# Patient Record
Sex: MALE | Race: WHITE | NOT HISPANIC OR LATINO | Employment: FULL TIME | URBAN - METROPOLITAN AREA
[De-identification: names, ages, dates, MRNs, and addresses within clinical notes are randomized per-mention and may not be internally consistent; named-entity substitution may affect disease eponyms.]

---

## 2023-03-14 PROBLEM — H18.513 FUCHS' CORNEAL DYSTROPHY OF BOTH EYES: Status: ACTIVE | Noted: 2023-03-14

## 2023-03-14 PROBLEM — R73.9 ELEVATED BLOOD SUGAR: Status: ACTIVE | Noted: 2023-03-14

## 2023-03-14 PROBLEM — K64.9 HEMORRHOID: Status: ACTIVE | Noted: 2023-03-14

## 2023-03-14 PROBLEM — E66.01 MORBID OBESITY WITH BMI OF 40.0-44.9, ADULT (MULTI): Status: ACTIVE | Noted: 2023-03-14

## 2023-03-14 PROBLEM — E78.5 HYPERLIPEMIA: Status: ACTIVE | Noted: 2023-03-14

## 2023-03-14 PROBLEM — E66.9 CLASS 2 OBESITY WITH BODY MASS INDEX (BMI) OF 39.0 TO 39.9 IN ADULT: Status: ACTIVE | Noted: 2023-03-14

## 2023-03-14 PROBLEM — G51.0 BELL'S PALSY: Status: ACTIVE | Noted: 2023-03-14

## 2023-03-14 PROBLEM — G47.33 OSA (OBSTRUCTIVE SLEEP APNEA): Status: ACTIVE | Noted: 2023-03-14

## 2023-03-14 PROBLEM — E55.9 VITAMIN D DEFICIENCY: Status: ACTIVE | Noted: 2023-03-14

## 2023-03-14 PROBLEM — I10 BENIGN HYPERTENSION: Status: ACTIVE | Noted: 2023-03-14

## 2023-03-14 PROBLEM — K21.9 GERD (GASTROESOPHAGEAL REFLUX DISEASE): Status: ACTIVE | Noted: 2023-03-14

## 2023-03-14 PROBLEM — K75.81 NASH (NONALCOHOLIC STEATOHEPATITIS): Status: ACTIVE | Noted: 2023-03-14

## 2023-03-14 PROBLEM — E11.9 TYPE 2 DIABETES MELLITUS WITHOUT COMPLICATION, WITHOUT LONG-TERM CURRENT USE OF INSULIN (MULTI): Status: ACTIVE | Noted: 2023-03-14

## 2023-03-14 PROBLEM — G56.00 CARPAL TUNNEL SYNDROME: Status: ACTIVE | Noted: 2023-03-14

## 2023-03-14 PROBLEM — E66.812 CLASS 2 OBESITY WITH BODY MASS INDEX (BMI) OF 39.0 TO 39.9 IN ADULT: Status: ACTIVE | Noted: 2023-03-14

## 2023-03-14 PROBLEM — M10.9 GOUT: Status: ACTIVE | Noted: 2023-03-14

## 2023-03-14 RX ORDER — ALLOPURINOL 100 MG/1
1 TABLET ORAL DAILY
COMMUNITY
Start: 2019-08-02 | End: 2023-07-07 | Stop reason: SDUPTHER

## 2023-03-14 RX ORDER — NEBIVOLOL 5 MG/1
1 TABLET ORAL DAILY
COMMUNITY
Start: 2020-10-01 | End: 2023-07-07 | Stop reason: SDUPTHER

## 2023-03-14 RX ORDER — FLUOROMETHOLONE 1 MG/ML
1 SUSPENSION/ DROPS OPHTHALMIC
COMMUNITY
Start: 2022-03-30

## 2023-03-14 RX ORDER — ATORVASTATIN CALCIUM 40 MG/1
1 TABLET, FILM COATED ORAL DAILY
COMMUNITY
Start: 2019-08-02 | End: 2023-07-07 | Stop reason: SDUPTHER

## 2023-03-14 RX ORDER — ASPIRIN 81 MG/1
81 TABLET ORAL
COMMUNITY

## 2023-03-14 RX ORDER — LORATADINE 10 MG/1
10 TABLET ORAL
COMMUNITY

## 2023-03-14 RX ORDER — CHOLECALCIFEROL (VITAMIN D3) 50 MCG
50 TABLET ORAL
COMMUNITY

## 2023-03-14 RX ORDER — METFORMIN HYDROCHLORIDE 500 MG/1
1 TABLET, EXTENDED RELEASE ORAL DAILY
COMMUNITY
Start: 2022-07-15 | End: 2023-07-07 | Stop reason: SDUPTHER

## 2023-03-15 ENCOUNTER — OFFICE VISIT (OUTPATIENT)
Dept: PRIMARY CARE | Facility: CLINIC | Age: 58
End: 2023-03-15
Payer: COMMERCIAL

## 2023-03-15 VITALS
SYSTOLIC BLOOD PRESSURE: 116 MMHG | BODY MASS INDEX: 41.29 KG/M2 | TEMPERATURE: 97.2 F | WEIGHT: 288.4 LBS | HEIGHT: 70 IN | HEART RATE: 79 BPM | DIASTOLIC BLOOD PRESSURE: 74 MMHG

## 2023-03-15 DIAGNOSIS — I10 BENIGN HYPERTENSION: ICD-10-CM

## 2023-03-15 DIAGNOSIS — E11.9 TYPE 2 DIABETES MELLITUS WITHOUT COMPLICATION, WITHOUT LONG-TERM CURRENT USE OF INSULIN (MULTI): ICD-10-CM

## 2023-03-15 DIAGNOSIS — J06.9 ACUTE UPPER RESPIRATORY INFECTION, UNSPECIFIED: Primary | ICD-10-CM

## 2023-03-15 PROCEDURE — 3078F DIAST BP <80 MM HG: CPT | Performed by: FAMILY MEDICINE

## 2023-03-15 PROCEDURE — 3074F SYST BP LT 130 MM HG: CPT | Performed by: FAMILY MEDICINE

## 2023-03-15 PROCEDURE — 99213 OFFICE O/P EST LOW 20 MIN: CPT | Performed by: FAMILY MEDICINE

## 2023-03-15 RX ORDER — AMOXICILLIN AND CLAVULANATE POTASSIUM 875; 125 MG/1; MG/1
875 TABLET, FILM COATED ORAL
Qty: 14 TABLET | Refills: 0 | Status: SHIPPED | OUTPATIENT
Start: 2023-03-15 | End: 2023-03-22

## 2023-03-15 NOTE — PROGRESS NOTES
"Subjective    Mohan Gibbons is a 57 y.o. male who presents for URI (Chest conrad , cough, no wheeze or sob/Sx started beginning of march).    Chect congestion  3/1/23 start day of symptoms, week 2 of illness, no real nasal congestion, using cpap machine    URI          Objective   /74   Pulse 79   Temp 36.2 °C (97.2 °F)   Ht 1.778 m (5' 10\")   Wt 131 kg (288 lb 6.4 oz)   BMI 41.38 kg/m²     Physical Exam  Vitals reviewed.   Constitutional:       General: He is not in acute distress.     Appearance: Normal appearance.   HENT:      Head: Normocephalic and atraumatic.      Right Ear: Tympanic membrane and external ear normal.      Left Ear: Tympanic membrane and external ear normal.      Nose: Congestion and rhinorrhea present.      Mouth/Throat:      Mouth: Mucous membranes are moist.      Pharynx: Posterior oropharyngeal erythema present.   Eyes:      Conjunctiva/sclera: Conjunctivae normal.   Cardiovascular:      Rate and Rhythm: Normal rate and regular rhythm.      Heart sounds: No murmur heard.  Pulmonary:      Effort: Pulmonary effort is normal.      Breath sounds: Rhonchi present. No wheezing or rales.   Musculoskeletal:      Cervical back: Normal range of motion.      Right lower leg: No edema.      Left lower leg: No edema.   Neurological:      Mental Status: He is alert.   Psychiatric:         Mood and Affect: Mood normal.         Behavior: Behavior normal.         Assessment/Plan   Problem List Items Addressed This Visit          Circulatory    Benign hypertension       Endocrine/Metabolic    Type 2 diabetes mellitus without complication, without long-term current use of insulin (CMS/[facility])     Other Visit Diagnoses       Acute upper respiratory infection, unspecified    -  Primary               "

## 2023-07-07 ENCOUNTER — OFFICE VISIT (OUTPATIENT)
Dept: PRIMARY CARE | Facility: CLINIC | Age: 58
End: 2023-07-07
Payer: COMMERCIAL

## 2023-07-07 ENCOUNTER — LAB (OUTPATIENT)
Dept: LAB | Facility: LAB | Age: 58
End: 2023-07-07
Payer: COMMERCIAL

## 2023-07-07 VITALS
BODY MASS INDEX: 40.92 KG/M2 | DIASTOLIC BLOOD PRESSURE: 78 MMHG | HEART RATE: 69 BPM | SYSTOLIC BLOOD PRESSURE: 128 MMHG | TEMPERATURE: 96.7 F | HEIGHT: 70 IN | WEIGHT: 285.8 LBS

## 2023-07-07 DIAGNOSIS — E11.69 TYPE 2 DIABETES MELLITUS WITH OTHER SPECIFIED COMPLICATION, UNSPECIFIED WHETHER LONG TERM INSULIN USE (MULTI): ICD-10-CM

## 2023-07-07 DIAGNOSIS — E11.9 TYPE 2 DIABETES MELLITUS WITHOUT COMPLICATION, WITHOUT LONG-TERM CURRENT USE OF INSULIN (MULTI): ICD-10-CM

## 2023-07-07 DIAGNOSIS — Z00.00 ANNUAL PHYSICAL EXAM: Primary | ICD-10-CM

## 2023-07-07 DIAGNOSIS — E66.01 MORBID OBESITY WITH BMI OF 40.0-44.9, ADULT (MULTI): ICD-10-CM

## 2023-07-07 DIAGNOSIS — G47.33 OSA (OBSTRUCTIVE SLEEP APNEA): ICD-10-CM

## 2023-07-07 DIAGNOSIS — E78.2 MIXED HYPERLIPIDEMIA: ICD-10-CM

## 2023-07-07 DIAGNOSIS — E55.9 VITAMIN D DEFICIENCY: ICD-10-CM

## 2023-07-07 DIAGNOSIS — B35.1 TOENAIL FUNGUS: ICD-10-CM

## 2023-07-07 DIAGNOSIS — K75.81 NASH (NONALCOHOLIC STEATOHEPATITIS): ICD-10-CM

## 2023-07-07 DIAGNOSIS — Z12.5 SCREENING FOR PROSTATE CANCER: ICD-10-CM

## 2023-07-07 DIAGNOSIS — I10 BENIGN HYPERTENSION: ICD-10-CM

## 2023-07-07 DIAGNOSIS — M10.9 GOUT, UNSPECIFIED CAUSE, UNSPECIFIED CHRONICITY, UNSPECIFIED SITE: ICD-10-CM

## 2023-07-07 LAB
ALANINE AMINOTRANSFERASE (SGPT) (U/L) IN SER/PLAS: 28 U/L (ref 10–52)
ALBUMIN (G/DL) IN SER/PLAS: 4 G/DL (ref 3.4–5)
ALBUMIN (MG/L) IN URINE: <7 MG/L
ALBUMIN/CREATININE (UG/MG) IN URINE: NORMAL UG/MG CRT (ref 0–30)
ALKALINE PHOSPHATASE (U/L) IN SER/PLAS: 78 U/L (ref 33–120)
ANION GAP IN SER/PLAS: 12 MMOL/L (ref 10–20)
ASPARTATE AMINOTRANSFERASE (SGOT) (U/L) IN SER/PLAS: 21 U/L (ref 9–39)
BILIRUBIN TOTAL (MG/DL) IN SER/PLAS: 0.6 MG/DL (ref 0–1.2)
CALCIDIOL (25 OH VITAMIN D3) (NG/ML) IN SER/PLAS: 37 NG/ML
CALCIUM (MG/DL) IN SER/PLAS: 9 MG/DL (ref 8.6–10.3)
CARBON DIOXIDE, TOTAL (MMOL/L) IN SER/PLAS: 24 MMOL/L (ref 21–32)
CHLORIDE (MMOL/L) IN SER/PLAS: 107 MMOL/L (ref 98–107)
CHOLESTEROL (MG/DL) IN SER/PLAS: 124 MG/DL (ref 0–199)
CHOLESTEROL IN HDL (MG/DL) IN SER/PLAS: 33.2 MG/DL
CHOLESTEROL/HDL RATIO: 3.7
CREATININE (MG/DL) IN SER/PLAS: 0.87 MG/DL (ref 0.5–1.3)
CREATININE (MG/DL) IN URINE: 180 MG/DL (ref 20–370)
ERYTHROCYTE DISTRIBUTION WIDTH (RATIO) BY AUTOMATED COUNT: 13.3 % (ref 11.5–14.5)
ERYTHROCYTE MEAN CORPUSCULAR HEMOGLOBIN CONCENTRATION (G/DL) BY AUTOMATED: 32.8 G/DL (ref 32–36)
ERYTHROCYTE MEAN CORPUSCULAR VOLUME (FL) BY AUTOMATED COUNT: 92 FL (ref 80–100)
ERYTHROCYTES (10*6/UL) IN BLOOD BY AUTOMATED COUNT: 4.55 X10E12/L (ref 4.5–5.9)
GFR MALE: >90 ML/MIN/1.73M2
GLUCOSE (MG/DL) IN SER/PLAS: 145 MG/DL (ref 74–99)
HEMATOCRIT (%) IN BLOOD BY AUTOMATED COUNT: 41.8 % (ref 41–52)
HEMOGLOBIN (G/DL) IN BLOOD: 13.7 G/DL (ref 13.5–17.5)
LDL: 46 MG/DL (ref 0–99)
LEUKOCYTES (10*3/UL) IN BLOOD BY AUTOMATED COUNT: 8.5 X10E9/L (ref 4.4–11.3)
NON HDL CHOLESTEROL: 91 MG/DL
PLATELETS (10*3/UL) IN BLOOD AUTOMATED COUNT: 233 X10E9/L (ref 150–450)
POC HEMOGLOBIN A1C: 6.5 % (ref 4.2–6.5)
POTASSIUM (MMOL/L) IN SER/PLAS: 4.5 MMOL/L (ref 3.5–5.3)
PROSTATE SPECIFIC AG (NG/ML) IN SER/PLAS: 0.29 NG/ML (ref 0–4)
PROTEIN TOTAL: 6.7 G/DL (ref 6.4–8.2)
SODIUM (MMOL/L) IN SER/PLAS: 138 MMOL/L (ref 136–145)
TRIGLYCERIDE (MG/DL) IN SER/PLAS: 226 MG/DL (ref 0–149)
UREA NITROGEN (MG/DL) IN SER/PLAS: 15 MG/DL (ref 6–23)
VLDL: 45 MG/DL (ref 0–40)

## 2023-07-07 PROCEDURE — 80061 LIPID PANEL: CPT

## 2023-07-07 PROCEDURE — 1036F TOBACCO NON-USER: CPT | Performed by: FAMILY MEDICINE

## 2023-07-07 PROCEDURE — 3008F BODY MASS INDEX DOCD: CPT | Performed by: FAMILY MEDICINE

## 2023-07-07 PROCEDURE — 82043 UR ALBUMIN QUANTITATIVE: CPT

## 2023-07-07 PROCEDURE — 3078F DIAST BP <80 MM HG: CPT | Performed by: FAMILY MEDICINE

## 2023-07-07 PROCEDURE — 3074F SYST BP LT 130 MM HG: CPT | Performed by: FAMILY MEDICINE

## 2023-07-07 PROCEDURE — 82306 VITAMIN D 25 HYDROXY: CPT

## 2023-07-07 PROCEDURE — 82570 ASSAY OF URINE CREATININE: CPT

## 2023-07-07 PROCEDURE — 84153 ASSAY OF PSA TOTAL: CPT

## 2023-07-07 PROCEDURE — 85027 COMPLETE CBC AUTOMATED: CPT

## 2023-07-07 PROCEDURE — 80053 COMPREHEN METABOLIC PANEL: CPT

## 2023-07-07 PROCEDURE — 36415 COLL VENOUS BLD VENIPUNCTURE: CPT

## 2023-07-07 PROCEDURE — 99396 PREV VISIT EST AGE 40-64: CPT | Performed by: FAMILY MEDICINE

## 2023-07-07 PROCEDURE — 83036 HEMOGLOBIN GLYCOSYLATED A1C: CPT | Performed by: FAMILY MEDICINE

## 2023-07-07 RX ORDER — NEBIVOLOL 5 MG/1
5 TABLET ORAL DAILY
Qty: 90 TABLET | Refills: 3 | Status: SHIPPED | OUTPATIENT
Start: 2023-07-07 | End: 2023-07-10 | Stop reason: SDUPTHER

## 2023-07-07 RX ORDER — METFORMIN HYDROCHLORIDE 500 MG/1
500 TABLET, EXTENDED RELEASE ORAL
Qty: 90 TABLET | Refills: 3 | Status: SHIPPED | OUTPATIENT
Start: 2023-07-07

## 2023-07-07 RX ORDER — CICLOPIROX 80 MG/ML
SOLUTION TOPICAL NIGHTLY
Qty: 6.6 ML | Refills: 2 | Status: SHIPPED | OUTPATIENT
Start: 2023-07-07

## 2023-07-07 RX ORDER — ALLOPURINOL 100 MG/1
100 TABLET ORAL DAILY
Qty: 90 TABLET | Refills: 3 | Status: SHIPPED | OUTPATIENT
Start: 2023-07-07

## 2023-07-07 RX ORDER — ATORVASTATIN CALCIUM 40 MG/1
40 TABLET, FILM COATED ORAL DAILY
Qty: 90 TABLET | Refills: 3 | Status: SHIPPED | OUTPATIENT
Start: 2023-07-07

## 2023-07-07 NOTE — PROGRESS NOTES
"Subjective    Mohan Gibbons is a 57 y.o. male who presents for No chief complaint on file..    Toenail fungus great toes b/l         Objective   /78   Pulse 69   Temp 35.9 °C (96.7 °F)   Ht 1.778 m (5' 10\")   Wt 130 kg (285 lb 12.8 oz)   BMI 41.01 kg/m²     Physical Exam  Vitals reviewed.   Constitutional:       General: He is not in acute distress.     Appearance: Normal appearance.   HENT:      Head: Normocephalic and atraumatic.      Right Ear: Tympanic membrane and ear canal normal.      Left Ear: Tympanic membrane and ear canal normal.   Eyes:      General: No scleral icterus.     Conjunctiva/sclera: Conjunctivae normal.   Cardiovascular:      Rate and Rhythm: Normal rate and regular rhythm.      Heart sounds: No murmur heard.  Pulmonary:      Effort: Pulmonary effort is normal.      Breath sounds: No wheezing, rhonchi or rales.   Abdominal:      General: Bowel sounds are normal.      Palpations: Abdomen is soft.      Tenderness: There is no abdominal tenderness.   Musculoskeletal:      Right lower leg: No edema.      Left lower leg: No edema.   Skin:     General: Skin is warm and dry.      Findings: No rash.      Comments: Thick brown discolored toenails b/l first toe   Neurological:      General: No focal deficit present.      Mental Status: He is alert.      Gait: Gait normal.   Psychiatric:         Mood and Affect: Mood normal.         Behavior: Behavior normal.         Assessment/Plan   Problem List Items Addressed This Visit       Benign hypertension    Relevant Medications    nebivolol (Bystolic) 5 mg tablet    Gout    Relevant Medications    allopurinol (Zyloprim) 100 mg tablet    Hyperlipemia    Relevant Medications    atorvastatin (Lipitor) 40 mg tablet    LING (nonalcoholic steatohepatitis)    ALONA (obstructive sleep apnea)    Type 2 diabetes mellitus without complication, without long-term current use of insulin (CMS/[facility])    Relevant Orders    POCT glycosylated hemoglobin (Hb A1C) " manually resulted (Completed)    CBC    Comprehensive Metabolic Panel    Lipid Panel    Vitamin D deficiency    Relevant Orders    Vitamin D, Total    Morbid obesity with BMI of 40.0-44.9, adult (CMS/[facility])     Other Visit Diagnoses       Annual physical exam    -  Primary    Screening for prostate cancer        Relevant Orders    Prostate Specific Antigen    Type 2 diabetes mellitus with other specified complication, unspecified whether long term insulin use (CMS/[facility])        Relevant Medications    metFORMIN XR (Glucophage-XR) 500 mg 24 hr tablet    Other Relevant Orders    Albumin , Urine Random    Toenail fungus        Relevant Medications    ciclopirox (Penlac) 8 % solution        Current Plans  · You are advised to get a flu shot annually  · You should eat a healthy diet and get regular exercise.  · You should see your dentist regularly every 6 months for dental health checkups unless you have had your teeth removed.  · Follow up in 6 mo for blood sugar recheckor as needed

## 2023-07-10 DIAGNOSIS — I10 BENIGN HYPERTENSION: ICD-10-CM

## 2023-07-10 RX ORDER — NEBIVOLOL 10 MG/1
10 TABLET ORAL DAILY
Qty: 90 TABLET | Refills: 3 | Status: SHIPPED | OUTPATIENT
Start: 2023-07-10

## 2024-05-13 ENCOUNTER — OFFICE VISIT (OUTPATIENT)
Dept: SLEEP MEDICINE | Facility: CLINIC | Age: 59
End: 2024-05-13
Payer: COMMERCIAL

## 2024-05-13 VITALS
RESPIRATION RATE: 18 BRPM | HEART RATE: 66 BPM | DIASTOLIC BLOOD PRESSURE: 78 MMHG | BODY MASS INDEX: 40.31 KG/M2 | SYSTOLIC BLOOD PRESSURE: 126 MMHG | WEIGHT: 281.6 LBS | OXYGEN SATURATION: 94 % | HEIGHT: 70 IN

## 2024-05-13 DIAGNOSIS — G47.33 OSA (OBSTRUCTIVE SLEEP APNEA): Primary | ICD-10-CM

## 2024-05-13 PROCEDURE — 99214 OFFICE O/P EST MOD 30 MIN: CPT | Performed by: GENERAL PRACTICE

## 2024-05-13 PROCEDURE — 3078F DIAST BP <80 MM HG: CPT | Performed by: GENERAL PRACTICE

## 2024-05-13 PROCEDURE — 1036F TOBACCO NON-USER: CPT | Performed by: GENERAL PRACTICE

## 2024-05-13 PROCEDURE — 3074F SYST BP LT 130 MM HG: CPT | Performed by: GENERAL PRACTICE

## 2024-05-13 PROCEDURE — 3008F BODY MASS INDEX DOCD: CPT | Performed by: GENERAL PRACTICE

## 2024-05-13 ASSESSMENT — SLEEP AND FATIGUE QUESTIONNAIRES
HOW LIKELY ARE YOU TO NOD OFF OR FALL ASLEEP WHILE SITTING AND READING: WOULD NEVER DOZE
HOW LIKELY ARE YOU TO NOD OFF OR FALL ASLEEP WHEN YOU ARE A PASSENGER IN A CAR FOR AN HOUR WITHOUT A BREAK: WOULD NEVER DOZE
HOW LIKELY ARE YOU TO NOD OFF OR FALL ASLEEP WHILE LYING DOWN TO REST IN THE AFTERNOON WHEN CIRCUMSTANCES PERMIT: SLIGHT CHANCE OF DOZING
SITING INACTIVE IN A PUBLIC PLACE LIKE A CLASS ROOM OR A MOVIE THEATER: WOULD NEVER DOZE
HOW LIKELY ARE YOU TO NOD OFF OR FALL ASLEEP WHILE WATCHING TV: SLIGHT CHANCE OF DOZING
HOW LIKELY ARE YOU TO NOD OFF OR FALL ASLEEP WHILE SITTING AND TALKING TO SOMEONE: WOULD NEVER DOZE
HOW LIKELY ARE YOU TO NOD OFF OR FALL ASLEEP WHILE SITTING QUIETLY AFTER LUNCH WITHOUT ALCOHOL: WOULD NEVER DOZE
ESS-CHAD TOTAL SCORE: 2
HOW LIKELY ARE YOU TO NOD OFF OR FALL ASLEEP IN A CAR, WHILE STOPPED FOR A FEW MINUTES IN TRAFFIC: WOULD NEVER DOZE

## 2024-05-13 ASSESSMENT — PATIENT HEALTH QUESTIONNAIRE - PHQ9
SUM OF ALL RESPONSES TO PHQ9 QUESTIONS 1 AND 2: 0
1. LITTLE INTEREST OR PLEASURE IN DOING THINGS: NOT AT ALL
2. FEELING DOWN, DEPRESSED OR HOPELESS: NOT AT ALL

## 2024-05-13 ASSESSMENT — ENCOUNTER SYMPTOMS
DEPRESSION: 0
OCCASIONAL FEELINGS OF UNSTEADINESS: 0
LOSS OF SENSATION IN FEET: 0

## 2024-05-13 ASSESSMENT — COLUMBIA-SUICIDE SEVERITY RATING SCALE - C-SSRS
1. IN THE PAST MONTH, HAVE YOU WISHED YOU WERE DEAD OR WISHED YOU COULD GO TO SLEEP AND NOT WAKE UP?: NO
6. HAVE YOU EVER DONE ANYTHING, STARTED TO DO ANYTHING, OR PREPARED TO DO ANYTHING TO END YOUR LIFE?: NO
2. HAVE YOU ACTUALLY HAD ANY THOUGHTS OF KILLING YOURSELF?: NO

## 2024-05-13 NOTE — PATIENT INSTRUCTIONS
[facility]   [facility]  [facility]  [address] [address]  [phone]       NAME: Mohan Gibbons   DATE: 5/13/2024     Your Sleep Provider Today: Jeffry Lerma, DO  Your Primary Care Physician: Vijaya Dee DO   Your Referring Provider: No ref. provider found    DIAGNOSIS:   1. ALONA (obstructive sleep apnea)  Positive Airway Pressure (PAP) Therapy          Thank you for coming to the Sleep Medicine Clinic today! Your sleep medicine provider today was: Jeffry Lerma DO Below is a summary of your treatment plan, other important information, and our contact numbers:      TREATMENT PLAN     Follow up in 1yr or sooner as needed.     Instructions - Common ALONA Recs: - For your sleep apnea, continue to use your PAP every night and use it whenever you are sleeping.   - Avoid alcohol or sedatives several hours prior to sleeping.   - Get additional supplies for your PAP (e.g., mask, hose, filters) every 3 months or as your insurance allows from your [employer] company. Replacement cushions for your PAP mask can be requested monthly if airseals are an issue.  - Remember to clean your mask, tubings, and water chamber regularly as instructed.  - Avoid driving or operating heavy machinery when drowsy. A person driving while sleepy is five (5) times more likely to have an accident. If you feel sleepy, pull over and take a short power nap (sleep for less than 30 minutes). Otherwise, ask somebody to drive you.        IMPORTANT INFORMATION     Call 911 for medical emergencies.  Our offices are generally open from Monday-Friday, 9 am - 5 pm.  If you need to get in touch with me, you may either call me and my team(number is below) or you can use [employer].  If a referral for a test, for CPAP, or for another specialist was made, and you have not heard about scheduling this within a week, please call scheduling at [phone]).  If you are unable to make your  appointment for clinic or an overnight study, kindly call the office at least 48 hours in advance to cancel and reschedule.  If you are on CPAP, please bring your device's card or the device to each clinic appointment.   There are no supporting services by either the sleep doctors or their staff on weekends and Holidays, or after 5 PM on weekdays.   If you have been asked to come to a sleep study, make sure you bring toiletries, a comfy pillow, and any nighttime medications that you may regularly take. Also be sure to eat dinner before you arrive. We generally do not provide meals.      PRESCRIPTIONS     We require 7 days advanced notice for prescription refills. If we do not receive the request in this time, we cannot guarantee that your medication will be refilled in time.      IMPORTANT PHONE NUMBERS     Sleep Medicine Clinic Fax: [phone]  Appointments (for Pediatric Sleep Clinic): [phone] - option 1  Appointments (for Adult Sleep Clinic): [phone] - option 2  Appointments (For Sleep Studies): [phone] - option 3  Behavioral Sleep Medicine: [phone]  Sleep Surgery: [phone]  ENT (Otolaryngology): [phone]  Headache Clinic (Neurology): [phone]  Neurology: [phone]  Psychiatry: [phone]  Pulmonary Function Testing (PFT) Center: [phone]  Pulmonary Medicine: [phone]  [employer] (DME): [phone]  [employer] (DME): [phone]  [facility] ([facility]): [phone]      OUR ADULT SLEEP MEDICINE TEAM   Please do not hesitate to call the office or sleep nurse with any questions between appointments:    Adult Sleep Nurses (Desiree Villasenor, RN and Brittany Zarate RN):  For clinical questions and refilling prescriptions: [phone]  Email sleep diaries and other documents at: [email or web address]    Adult Sleep Medicine Secretaries:  Corinne Bhandari (For Mac/Gamaliel/Soheila/Romero/Nelia/Blade):   P: [phone]   F: [phone]  Liz Bermeo (For Mojica/[place]): P: [phone]  Fax: [phone]  Lizzyse Jacobs (For Jurcevic/Blank): P: [phone]  F: [phone]  Edith Ray (For [place]): P: [phone]  F: [phone]  Sharron Cadena (For Iveth/Ana/Zakhary): P: [phone]  F: [phone]  Calli Vickers (For Douglas/Figueroa): P: [phone]  F: [phone]     Adult Sleep Medicine Advanced Practice Providers:  Jarocho Michaels ([place], [place])  Melly Perez ([facility])  Vijaya Johnson CNP ([place], [place], [place])  Connie Parnell CNP ([place], [place], [place])  Skyla Coyne ([place], [place], [place])  David Figueroa CNP ([place], [place])        OUR SLEEP TESTING LOCATIONS     Our team will contact you to schedule your sleep study, however, you can contact us as follow:  Main Phone Line (scheduling only): [phone]), option 3  Adult and Pediatric Locations  [facility] (6 years and older): [facility] - 4th floor ([address] [place] [address]) After hours line: [phone]  [facility] (Main campus: All ages): [facility], 6th floor. After hours line: [phone]   [place] (5 years and older; younger considered on case-by-case basis): [address], [address]. Scheduling  After hours line: [phone]   [place] (6 years and older): [address]; [address]; [address]   [place] (6 years and older): [address] [facility], [address]  After hours line: [phone]   [facility] (13 years and older) in [place]: [address], 2nd floor  After hours line: [phone]  [facility] (13 year and older): [address]  After hours line: [phone]     Adult Only Locations:   [place] (18 years and older): [address] [facility], 2nd floor   [place] (18 years and older): [address] [facility]; 4th floor  After hours line: [phone]  [facility]  "[place] (18 years and older) at [place]: [address] [place] [address]  After hours line: [phone]          CONTACTING YOUR SLEEP MEDICINE PROVIDER     Send a message directly to your provider through \"My Chart\", which is the email service through your  Records Account: [email or web address]   Call [phone] and leave a message. One of the administrative assistants will forward the message to your sleep medicine provider through \"My Chart\" and/or email.     Your sleep medicine provider for this visit was: Jeffry Lerma DO        "

## 2024-05-13 NOTE — PROGRESS NOTES
Patient: Mohan Gibbons    [id number]  : 1965 -- AGE 58 y.o.    Provider: Jeffry Lerma DO     Location [facility]   Service Date: 2024              [facility] Sleep Medicine Clinic  Follow up Visit Note        HISTORY OF PRESENT ILLNESS     HISTORY OF PRESENT ILLNESS   Mohan Gibbons is a 58 y.o. male who presents to a [facility] Sleep Medicine Clinic for a sleep medicine evaluation with concerns of Follow-up (Yearly follow up no issues or concerns with cpap ).     The patient  has a past medical history of Endothelial corneal dystrophy, unspecified eye, Other conditions influencing health status, Other conditions influencing health status, Personal history of other diseases of the circulatory system, Personal history of other diseases of the digestive system, Personal history of other diseases of the musculoskeletal system and connective tissue, Personal history of other diseases of the nervous system and sense organs, Personal history of other endocrine, nutritional and metabolic disease (2022), and Prediabetes (2022)..    PAST SLEEP HISTORY    M with hx significant for morbid obesity (BMI 40, last CO2 23), HTN, GERD, HLD, gout, LING, T2DM, vit D deficiency, diagnosed with severe ALONA IN  (overall RDI 81, O2 javier 65%, BMI 36).     Pt reports that he has been using a VPAP RESMED since . Settings IPAP 14 cm H20, EPAP 10 cm H2O. Has a portable machine for when he is traveling.        10/21/22  patient had a sleep study done and would like to discuss his results.   patient states that he has been using his pap therapy regularly.   He did not bring his machine with him today.      23  Patient is comfortable with pressure and mask; however he has a mask leak.       5/15/23  Patient tried nasal pillows but they were not comfortable. He is using a chin strap which helped him with the leak.   He is comfortable with pressure and current mask.         5/13/24  Patient is tying to use pap therapy regularly, he has a travel machine for when he travels.     PAP Related Problems: no leak perceived,~no dry mouth,~no skin irritation from mask~and~no snoring with PAP.   Perceived Benefits of PAP Therapy: refreshing sleep,~decreased nocturnal awakenings,~decreased snoring/ choking/ gasping~and~decreased morning headaches.     Sleep schedule  on weekdays / work days:  Usual Bedtime: 11:30pm  Sleep latency: 30min  Wake time : 7:30am   Total sleep time average/day: 7.5 hours/day  Awakenings: 1-2x per week, nocturia, short.   Naps: 7:30pm, 30min, every other day, dozes off while watching TV, no pap use during that time.     Sleep schedule  on weekends/non work days :  Usual Bedtime:   12am  Wake time : 9am    Sleep aids: n  Stimulants: n    Occupation: Works as a banker.     Preferred sleeping position: SLEEP POSITION: sidelying    Sleep-related ROS:    Snoring:  n  Witnessed apneas:   n     Gasping/ choking: n    Am Dry mouth:  n           Nasal congestion:   sometimes      am headaches: n    Sleep is described as refreshing.     Drowsy driving: n  Hx of car accident: n  Near-miss Car accident: n      RLS screen:  RLSSCREEN: - Sensations: Patient does not have unusual sensations in their extremities that cause an urge to move them     Sleep-related behaviors: DENIES    ESS: 2       REVIEW OF SYSTEMS     REVIEW OF SYSTEMS  Review of Systems   All other systems reviewed and are negative.        ALLERGIES AND MEDICATIONS     ALLERGIES  No Known Allergies    MEDICATIONS  Current Outpatient Medications   Medication Sig Dispense Refill    allopurinol (Zyloprim) 100 mg tablet Take 1 tablet (100 mg) by mouth once daily. 90 tablet 3    aspirin 81 mg EC tablet Take 1 tablet (81 mg) by mouth.      atorvastatin (Lipitor) 40 mg tablet Take 1 tablet (40 mg) by mouth once daily. 90 tablet 3    cholecalciferol (Vitamin D-3) 50 MCG (2000 UT) tablet Take 1 tablet (50 mcg) by mouth.    "   ciclopirox (Penlac) 8 % solution Apply topically once daily at bedtime. , remove buildup with alcohol swab once weekly 6.6 mL 2    fluorometholone (FML) 0.1 % ophthalmic suspension Administer 1 drop into both eyes.      loratadine (Claritin) 10 mg tablet Take 1 tablet (10 mg) by mouth.      metFORMIN XR (Glucophage-XR) 500 mg 24 hr tablet Take 1 tablet (500 mg) by mouth once daily in the evening. Take with meals. 90 tablet 3    nebivolol (Bystolic) 10 mg tablet Take 1 tablet (10 mg) by mouth once daily. 90 tablet 3     No current facility-administered medications for this visit.         PAST HISTORY     PAST MEDICAL HISTORY  He  has a past medical history of Endothelial corneal dystrophy, unspecified eye, Other conditions influencing health status, Other conditions influencing health status, Personal history of other diseases of the circulatory system, Personal history of other diseases of the digestive system, Personal history of other diseases of the musculoskeletal system and connective tissue, Personal history of other diseases of the nervous system and sense organs, Personal history of other endocrine, nutritional and metabolic disease (04/05/2022), and Prediabetes (07/19/2022).    PAST SURGICAL HISTORY:  Past Surgical History:   Procedure Laterality Date    OTHER SURGICAL HISTORY  08/07/2020    Eye surgery    OTHER SURGICAL HISTORY  07/30/2019    Appendectomy       FAMILY HISTORY  Family History   Problem Relation Name Age of Onset    Colon cancer Mother      Hyperlipidemia Father      Hypertension Father           SOCIAL HISTORY  He  reports that he has quit smoking. His smoking use included cigarettes. He has never used smokeless tobacco. He reports current alcohol use. He reports that he does not use drugs.     Caffeine consumption: 20oz coffee in am, sometimes pop with lunch.    PHYSICAL EXAM     VITAL SIGNS: /78   Pulse 66   Resp 18   Ht 1.778 m (5' 10\")   Wt 128 kg (281 lb 9.6 oz)   SpO2 " 94%   BMI 40.41 kg/m²      PREVIOUS WEIGHTS:  Wt Readings from Last 3 Encounters:   05/13/24 128 kg (281 lb 9.6 oz)   07/07/23 130 kg (285 lb 12.8 oz)   03/15/23 131 kg (288 lb 6.4 oz)       Physical Exam  Constitutional: Alert and oriented, cooperative, no obvious distress.   HENT: normocephalic.   Neurologic: AOx3.   psychiatric: appropriate mood and affect.  Integumentary: no significant rashes observed over pap mask area.       RESULTS/DATA         ASSESSMENT/PLAN     Mr. Gibbons is a 58 y.o. male and  has a past medical history of Endothelial corneal dystrophy, unspecified eye, Other conditions influencing health status, Other conditions influencing health status, Personal history of other diseases of the circulatory system, Personal history of other diseases of the digestive system, Personal history of other diseases of the musculoskeletal system and connective tissue, Personal history of other diseases of the nervous system and sense organs, Personal history of other endocrine, nutritional and metabolic disease (04/05/2022), and Prediabetes (07/19/2022). He is following up on sleep apnea.     Problem List Items Addressed This Visit    None      Problem List and Orders    M with hx significant for morbid obesity (BMI 40, last CO2 23), HTN, GERD, HLD, gout, LING, prediabetes, vit D deficiency, diagnosed with severe ALONA IN 2007 (overall RDI 81, O2 javier 65%, BMI 36)     1- Severe ALONA   - PSG 3/1/2007: overall RDI 81, O2 javier 65%, spent 80 min of sleep with O2<90%. BMI was 36 at that time  -HST 9/9/22 --> severe ALONA, AHI3% 73.6, AHI 4% 71.1, SpO2 javier 64.3%. consider titration      Reviewed and discussed the above sleep study results as well as download data and management options in details. All questions answered, patient verbalizes understanding.      - cont. Bilevel 14/10cwp, rAHI 1.3, good seal, benefiting from treatment.   -His leak is much improved with a chin strap --> cont. ; he uses a nasal mask.    -has a travel machine which he uses when he travels.   -His DME is [facility] but sometimes buys supplies out of pocket from [employer].   -ordered supplies, order sent to [facility] but also provided paper copy to patient.      -do not drive or operate heavy machinery if drowsy.  -avoid sleeping on your back.   -avoid sedating substances/ medication, alcohol, illicit drugs and tobacco.        2- Obesity  counseled on eating a healthy diet and exercising as tolerated.   was trying to establish care with nutrition but states that he no longer wishes to do so.      f/u 12 months or sooner as needed

## 2024-06-24 DIAGNOSIS — E78.2 MIXED HYPERLIPIDEMIA: ICD-10-CM

## 2024-06-24 RX ORDER — ATORVASTATIN CALCIUM 40 MG/1
40 TABLET, FILM COATED ORAL DAILY
Qty: 90 TABLET | Refills: 0 | Status: SHIPPED | OUTPATIENT
Start: 2024-06-24 | End: 2024-06-26 | Stop reason: SDUPTHER

## 2024-06-24 NOTE — TELEPHONE ENCOUNTER
[facility] lm for refill  Former Luiz patient    You have not seen him yet, he is scheduled to see you this week , 6/26, do you want to fill now or wait until he is seen?

## 2024-06-26 ENCOUNTER — APPOINTMENT (OUTPATIENT)
Dept: PRIMARY CARE | Facility: CLINIC | Age: 59
End: 2024-06-26
Payer: COMMERCIAL

## 2024-06-26 ENCOUNTER — LAB (OUTPATIENT)
Dept: LAB | Facility: LAB | Age: 59
End: 2024-06-26
Payer: COMMERCIAL

## 2024-06-26 VITALS
TEMPERATURE: 97.6 F | BODY MASS INDEX: 39.91 KG/M2 | SYSTOLIC BLOOD PRESSURE: 120 MMHG | DIASTOLIC BLOOD PRESSURE: 78 MMHG | RESPIRATION RATE: 18 BRPM | OXYGEN SATURATION: 94 % | WEIGHT: 278.8 LBS | HEART RATE: 75 BPM | HEIGHT: 70 IN

## 2024-06-26 DIAGNOSIS — I10 BENIGN HYPERTENSION: ICD-10-CM

## 2024-06-26 DIAGNOSIS — E78.2 MIXED HYPERLIPIDEMIA: ICD-10-CM

## 2024-06-26 DIAGNOSIS — E11.69 TYPE 2 DIABETES MELLITUS WITH OTHER SPECIFIED COMPLICATION, UNSPECIFIED WHETHER LONG TERM INSULIN USE (MULTI): ICD-10-CM

## 2024-06-26 DIAGNOSIS — M10.9 GOUT, UNSPECIFIED CAUSE, UNSPECIFIED CHRONICITY, UNSPECIFIED SITE: ICD-10-CM

## 2024-06-26 DIAGNOSIS — Z76.89 ENCOUNTER TO ESTABLISH CARE: Primary | ICD-10-CM

## 2024-06-26 LAB
ALBUMIN SERPL BCP-MCNC: 4.1 G/DL (ref 3.4–5)
ALP SERPL-CCNC: 84 U/L (ref 33–136)
ALT SERPL W P-5'-P-CCNC: 27 U/L (ref 10–52)
ANION GAP SERPL CALC-SCNC: 11 MMOL/L (ref 10–20)
AST SERPL W P-5'-P-CCNC: 19 U/L (ref 9–39)
BILIRUB SERPL-MCNC: 0.5 MG/DL (ref 0–1.2)
BUN SERPL-MCNC: 12 MG/DL (ref 6–23)
CALCIUM SERPL-MCNC: 9.2 MG/DL (ref 8.6–10.3)
CHLORIDE SERPL-SCNC: 108 MMOL/L (ref 98–107)
CHOLEST SERPL-MCNC: 121 MG/DL (ref 0–199)
CHOLESTEROL/HDL RATIO: 3.1
CO2 SERPL-SCNC: 24 MMOL/L (ref 21–32)
CREAT SERPL-MCNC: 0.85 MG/DL (ref 0.5–1.3)
EGFRCR SERPLBLD CKD-EPI 2021: >90 ML/MIN/1.73M*2
ERYTHROCYTE [DISTWIDTH] IN BLOOD BY AUTOMATED COUNT: 13.2 % (ref 11.5–14.5)
EST. AVERAGE GLUCOSE BLD GHB EST-MCNC: 148 MG/DL
GLUCOSE SERPL-MCNC: 169 MG/DL (ref 74–99)
HBA1C MFR BLD: 6.8 %
HCT VFR BLD AUTO: 42.3 % (ref 41–52)
HDLC SERPL-MCNC: 39.4 MG/DL
HGB BLD-MCNC: 14 G/DL (ref 13.5–17.5)
LDLC SERPL CALC-MCNC: 49 MG/DL
MCH RBC QN AUTO: 30.4 PG (ref 26–34)
MCHC RBC AUTO-ENTMCNC: 33.1 G/DL (ref 32–36)
MCV RBC AUTO: 92 FL (ref 80–100)
NON HDL CHOLESTEROL: 82 MG/DL (ref 0–149)
NRBC BLD-RTO: 0 /100 WBCS (ref 0–0)
PLATELET # BLD AUTO: 236 X10*3/UL (ref 150–450)
POTASSIUM SERPL-SCNC: 4.3 MMOL/L (ref 3.5–5.3)
PROT SERPL-MCNC: 6.7 G/DL (ref 6.4–8.2)
RBC # BLD AUTO: 4.61 X10*6/UL (ref 4.5–5.9)
SODIUM SERPL-SCNC: 139 MMOL/L (ref 136–145)
TRIGL SERPL-MCNC: 162 MG/DL (ref 0–149)
VLDL: 32 MG/DL (ref 0–40)
WBC # BLD AUTO: 8.9 X10*3/UL (ref 4.4–11.3)

## 2024-06-26 PROCEDURE — 3078F DIAST BP <80 MM HG: CPT | Performed by: NURSE PRACTITIONER

## 2024-06-26 PROCEDURE — 1036F TOBACCO NON-USER: CPT | Performed by: NURSE PRACTITIONER

## 2024-06-26 PROCEDURE — 83036 HEMOGLOBIN GLYCOSYLATED A1C: CPT

## 2024-06-26 PROCEDURE — 99214 OFFICE O/P EST MOD 30 MIN: CPT | Performed by: NURSE PRACTITIONER

## 2024-06-26 PROCEDURE — 80061 LIPID PANEL: CPT

## 2024-06-26 PROCEDURE — 85027 COMPLETE CBC AUTOMATED: CPT

## 2024-06-26 PROCEDURE — 3008F BODY MASS INDEX DOCD: CPT | Performed by: NURSE PRACTITIONER

## 2024-06-26 PROCEDURE — 36415 COLL VENOUS BLD VENIPUNCTURE: CPT

## 2024-06-26 PROCEDURE — 3074F SYST BP LT 130 MM HG: CPT | Performed by: NURSE PRACTITIONER

## 2024-06-26 PROCEDURE — 80053 COMPREHEN METABOLIC PANEL: CPT

## 2024-06-26 RX ORDER — NEBIVOLOL 10 MG/1
10 TABLET ORAL DAILY
Qty: 90 TABLET | Refills: 3 | Status: SHIPPED | OUTPATIENT
Start: 2024-06-26

## 2024-06-26 RX ORDER — ALLOPURINOL 100 MG/1
100 TABLET ORAL DAILY
Qty: 90 TABLET | Refills: 3 | Status: SHIPPED | OUTPATIENT
Start: 2024-06-26

## 2024-06-26 RX ORDER — METFORMIN HYDROCHLORIDE 500 MG/1
500 TABLET, EXTENDED RELEASE ORAL
Qty: 90 TABLET | Refills: 3 | Status: SHIPPED | OUTPATIENT
Start: 2024-06-26

## 2024-06-26 RX ORDER — ATORVASTATIN CALCIUM 40 MG/1
40 TABLET, FILM COATED ORAL DAILY
Qty: 90 TABLET | Refills: 0 | Status: SHIPPED | OUTPATIENT
Start: 2024-06-26

## 2024-06-26 RX ORDER — NEBIVOLOL 10 MG/1
10 TABLET ORAL DAILY
Qty: 90 TABLET | Refills: 3 | OUTPATIENT
Start: 2024-06-26

## 2024-06-26 ASSESSMENT — ENCOUNTER SYMPTOMS
DIARRHEA: 0
FEVER: 0
CONSTIPATION: 0
SHORTNESS OF BREATH: 0
SLEEP DISTURBANCE: 0
COUGH: 0
NAUSEA: 0
NERVOUS/ANXIOUS: 0
WEAKNESS: 0
AGITATION: 0
FATIGUE: 0
VOMITING: 0

## 2024-06-26 NOTE — PROGRESS NOTES
"Subjective   Patient ID: Mohan Gibbons is a 58 y.o. male who presents for Med Refill.    He is here for medication refills and blood work, needs screening for work and insurance. He has no current concerns and is feeling well.          Review of Systems   Constitutional:  Negative for fatigue and fever.   Respiratory:  Negative for cough and shortness of breath.    Cardiovascular:  Negative for chest pain and leg swelling.   Gastrointestinal:  Negative for constipation, diarrhea, nausea and vomiting.   Skin:  Negative for pallor and rash.   Neurological:  Negative for weakness.   Psychiatric/Behavioral:  Negative for agitation, behavioral problems and sleep disturbance. The patient is not nervous/anxious.        Objective   /78   Pulse 75   Temp 36.4 °C (97.6 °F)   Resp 18   Ht 1.778 m (5' 10\")   Wt 126 kg (278 lb 12.8 oz)   SpO2 94%   BMI 40.00 kg/m²     Physical Exam  Vitals and nursing note reviewed.   Constitutional:       General: He is not in acute distress.  HENT:      Head: Normocephalic and atraumatic.   Eyes:      Pupils: Pupils are equal, round, and reactive to light.   Cardiovascular:      Rate and Rhythm: Normal rate and regular rhythm.   Pulmonary:      Effort: Pulmonary effort is normal.      Breath sounds: Normal breath sounds.   Skin:     General: Skin is warm and dry.   Neurological:      Mental Status: He is alert and oriented to person, place, and time.   Psychiatric:         Mood and Affect: Mood normal.         Assessment/Plan   Problem List Items Addressed This Visit             ICD-10-CM    Gout M10.9    Relevant Medications    allopurinol (Zyloprim) 100 mg tablet    Hyperlipemia E78.5    Relevant Medications    atorvastatin (Lipitor) 40 mg tablet    Other Relevant Orders    Comprehensive Metabolic Panel    CBC    Hemoglobin A1C    Lipid Panel     Other Visit Diagnoses         Codes    Encounter to establish care    -  Primary Z76.89    Type 2 diabetes mellitus with other " specified complication, unspecified whether long term insulin use (Multi)     E11.69    Relevant Medications    metFORMIN  mg 24 hr tablet    Other Relevant Orders    Comprehensive Metabolic Panel    CBC    Hemoglobin A1C    Lipid Panel

## 2024-06-27 ENCOUNTER — TELEPHONE (OUTPATIENT)
Dept: PRIMARY CARE | Facility: CLINIC | Age: 59
End: 2024-06-27
Payer: COMMERCIAL

## 2024-06-27 NOTE — RESULT ENCOUNTER NOTE
Blood work is stable, slightly elevated A1C than last year, triglycerides improved since last year. No need for any changes or new medications. Continue to try and eat a healthy diet and exercise daily.

## 2024-06-27 NOTE — TELEPHONE ENCOUNTER
----- Message from FLORESITA Perera sent at 6/27/2024  8:04 AM EDT -----  Blood work is stable, slightly elevated A1C than last year, triglycerides improved since last year. No need for any changes or new medications. Continue to try and eat a healthy diet and exercise daily.

## 2025-01-07 ENCOUNTER — TELEPHONE (OUTPATIENT)
Dept: PRIMARY CARE | Facility: CLINIC | Age: 60
End: 2025-01-07
Payer: COMMERCIAL

## 2025-03-13 ENCOUNTER — APPOINTMENT (OUTPATIENT)
Dept: PRIMARY CARE | Facility: CLINIC | Age: 60
End: 2025-03-13
Payer: COMMERCIAL

## 2025-03-13 VITALS
OXYGEN SATURATION: 98 % | RESPIRATION RATE: 20 BRPM | BODY MASS INDEX: 40.86 KG/M2 | HEIGHT: 70 IN | WEIGHT: 285.4 LBS | TEMPERATURE: 95 F | HEART RATE: 72 BPM | DIASTOLIC BLOOD PRESSURE: 86 MMHG | SYSTOLIC BLOOD PRESSURE: 130 MMHG

## 2025-03-13 DIAGNOSIS — G47.33 OSA (OBSTRUCTIVE SLEEP APNEA): ICD-10-CM

## 2025-03-13 DIAGNOSIS — E66.01 CLASS 3 SEVERE OBESITY DUE TO EXCESS CALORIES WITH SERIOUS COMORBIDITY AND BODY MASS INDEX (BMI) OF 40.0 TO 44.9 IN ADULT: ICD-10-CM

## 2025-03-13 DIAGNOSIS — Z96.1 PSEUDOPHAKIA OF BOTH EYES: ICD-10-CM

## 2025-03-13 DIAGNOSIS — E11.9 TYPE 2 DIABETES MELLITUS WITHOUT COMPLICATION, WITHOUT LONG-TERM CURRENT USE OF INSULIN (MULTI): Primary | ICD-10-CM

## 2025-03-13 DIAGNOSIS — E11.9 TYPE 2 DIABETES MELLITUS WITHOUT COMPLICATION, WITHOUT LONG-TERM CURRENT USE OF INSULIN (MULTI): ICD-10-CM

## 2025-03-13 DIAGNOSIS — E66.813 CLASS 3 SEVERE OBESITY DUE TO EXCESS CALORIES WITH SERIOUS COMORBIDITY AND BODY MASS INDEX (BMI) OF 40.0 TO 44.9 IN ADULT: ICD-10-CM

## 2025-03-13 DIAGNOSIS — K75.81 NASH (NONALCOHOLIC STEATOHEPATITIS): ICD-10-CM

## 2025-03-13 DIAGNOSIS — B35.1 TOENAIL FUNGUS: ICD-10-CM

## 2025-03-13 DIAGNOSIS — E55.9 VITAMIN D DEFICIENCY: ICD-10-CM

## 2025-03-13 DIAGNOSIS — E78.2 MIXED HYPERLIPIDEMIA: ICD-10-CM

## 2025-03-13 DIAGNOSIS — K21.9 GASTROESOPHAGEAL REFLUX DISEASE WITHOUT ESOPHAGITIS: ICD-10-CM

## 2025-03-13 DIAGNOSIS — H18.513 FUCHS' CORNEAL DYSTROPHY OF BOTH EYES: ICD-10-CM

## 2025-03-13 DIAGNOSIS — I10 PRIMARY HYPERTENSION: ICD-10-CM

## 2025-03-13 DIAGNOSIS — M10.9 GOUT, UNSPECIFIED CAUSE, UNSPECIFIED CHRONICITY, UNSPECIFIED SITE: ICD-10-CM

## 2025-03-13 PROBLEM — K64.9 HEMORRHOID: Status: RESOLVED | Noted: 2023-03-14 | Resolved: 2025-03-13

## 2025-03-13 PROBLEM — G56.00 CARPAL TUNNEL SYNDROME: Status: RESOLVED | Noted: 2023-03-14 | Resolved: 2025-03-13

## 2025-03-13 PROBLEM — G51.0 BELL'S PALSY: Status: RESOLVED | Noted: 2023-03-14 | Resolved: 2025-03-13

## 2025-03-13 PROBLEM — E11.69 TYPE 2 DIABETES MELLITUS WITH OTHER SPECIFIED COMPLICATION, UNSPECIFIED WHETHER LONG TERM INSULIN USE (MULTI): Status: ACTIVE | Noted: 2023-03-14

## 2025-03-13 LAB — POC HEMOGLOBIN A1C: 8.9 % (ref 4.2–6.5)

## 2025-03-13 PROCEDURE — 83036 HEMOGLOBIN GLYCOSYLATED A1C: CPT

## 2025-03-13 PROCEDURE — 3008F BODY MASS INDEX DOCD: CPT

## 2025-03-13 PROCEDURE — 3075F SYST BP GE 130 - 139MM HG: CPT

## 2025-03-13 PROCEDURE — 3079F DIAST BP 80-89 MM HG: CPT

## 2025-03-13 PROCEDURE — 99214 OFFICE O/P EST MOD 30 MIN: CPT

## 2025-03-13 RX ORDER — METFORMIN HYDROCHLORIDE 500 MG/1
1000 TABLET, EXTENDED RELEASE ORAL
COMMUNITY

## 2025-03-13 RX ORDER — TIRZEPATIDE 2.5 MG/.5ML
2.5 INJECTION, SOLUTION SUBCUTANEOUS WEEKLY
Qty: 2 ML | Refills: 0 | Status: SHIPPED | OUTPATIENT
Start: 2025-03-13

## 2025-03-13 RX ORDER — CICLOPIROX 80 MG/ML
SOLUTION TOPICAL NIGHTLY
Qty: 6.6 ML | Refills: 11 | Status: SHIPPED | OUTPATIENT
Start: 2025-03-13

## 2025-03-13 RX ORDER — TIRZEPATIDE 2.5 MG/.5ML
2.5 INJECTION, SOLUTION SUBCUTANEOUS WEEKLY
Qty: 2 UNSPECIFIED | Refills: 0 | Status: SHIPPED | OUTPATIENT
Start: 2025-03-13 | End: 2025-03-13 | Stop reason: SDUPTHER

## 2025-03-13 NOTE — PROGRESS NOTES
"Subjective   Mohan Gibbons is a 59 y.o. male   Patient has a history of type 2 diabetes.  A1c from 6/26/2024 was at goal, 6.8%.  He has just been on metformin  mg once daily.  He also wants to lose weight.  He is retiring in April and he would like to definitely start focusing more on eating healthier and exercising more.  He denies any polyuria, polydipsia, neuropathy symptoms.  He is not focusing on a low carbohydrate diet that much right now.    Objective   /86   Pulse 72   Temp 35 °C (95 °F)   Resp 20   Ht 1.778 m (5' 10\")   Wt 129 kg (285 lb 6.4 oz)   SpO2 98%   BMI 40.95 kg/m²    PHYSICAL EXAM  Gen: Well appearing, in NAD  Eyes: EOMI  HEENT: MMM  Heart: RRR, no murmurs  Lungs: No increased work of breathing, CTAB, on RA  GI: Soft, NTND, no guarding or rebound  Extremities: WWP, cap refill <2sec, no pitting edema in LE b/l  Neuro: Alert, symmetrical facies, moves all extremities equally  Psych: Appropriate mood and affect    Assessment/Plan     Follow-up in 1 month for an annual physical    Problem List Items Addressed This Visit       Primary hypertension    Overview     Well-controlled on nebivolol 10 mg daily         Fuchs' corneal dystrophy of both eyes    Overview     Follows with ophthalmologist.  S/p b/l corneal transplant         GERD (gastroesophageal reflux disease)    Gout    Overview     Well-controlled and no recent flares on allopurinol 100 mg daily as prophylaxis         Relevant Orders    Uric acid (Completed)    Class 3 severe obesity due to excess calories with serious comorbidity and body mass index (BMI) of 40.0 to 44.9 in adult    Overview     Counseled on healthy diet and regular exercise         Current Assessment & Plan     Will see if we can get Mounjaro covered by patient's insurance to treat type 2 diabetes, obesity, and ALONA         Hyperlipemia    Overview     On atorvastatin 40 mg daily         LING (nonalcoholic steatohepatitis)    Overview     No " transaminitis         ALONA (obstructive sleep apnea)    Overview     Tolerates CPAP         Current Assessment & Plan     Will see if we can get Mounjaro patient's insurance to type, obesity, ALONA         Type 2 diabetes mellitus with other specified complication, unspecified whether long term insulin use (Multi) - Primary    Overview     A1c in the office today has increased significantly.  9 months ago it was 6.8%.  Today it is 8.9%.  Discussed at length the importance of decreasing the carbohydrate/sugar in his diet.  He is retiring next month and thinks he can put a lot more effort into getting serious about his diet and exercise at that point.  Will increase his metformin from 500 mg once daily to 1000 mg twice daily.  Continue extended release formulation to decrease GI side effects.  Will also see if we can get Mounjaro covered to treat patient's type 2 diabetes, obesity, ALONA.  Will start on Mounjaro 2.5 mg weekly.  Will also place referral to my clinical pharmacist colleague to help with GLP-1 and diabetes management.  He will get full annual blood work as well as a urine microalbumin.  He is up-to-date on annual diabetic eye exam.  Follow-up in 1 month for an annual physical.         Vitamin D deficiency    Overview     Takes vitamin D 2000 international units daily         Pseudophakia of both eyes    Toenail fungus    Overview     Using Penlac solution         Relevant Medications    ciclopirox (Penlac) 8 % solution     Jasmine Joy D.O.  Family Medicine Physician  [facility]  [address] [place] [address] [phone]    This note has been transcribed using Dragon voice recognition system and there is a possibility of unintentional typing misprints.

## 2025-03-13 NOTE — PATIENT INSTRUCTIONS
Increase metformin to 1 pill twice a day for a week, then 2 pills in the morning and 1 pill at night for 1 week, then 2 pills twice a day everyday thereafter   High Dose Vitamin A Pregnancy And Lactation Text: High dose vitamin A therapy is contraindicated during pregnancy and breast feeding.

## 2025-03-13 NOTE — TELEPHONE ENCOUNTER
Mychart msg  Can you resend [place] [address] Clinical Support Staff  Phone Number: [phone]     I'm looking at my chart, is Manjaro prescription sent to [facility] in [place]? My current pharmacy is [facility].

## 2025-03-14 LAB
25(OH)D3+25(OH)D2 SERPL-MCNC: 39 NG/ML (ref 30–100)
ALBUMIN SERPL-MCNC: 4.3 G/DL (ref 3.6–5.1)
ALBUMIN/CREAT UR: 5 MG/G CREAT
ALP SERPL-CCNC: 77 U/L (ref 35–144)
ALT SERPL-CCNC: 46 U/L (ref 9–46)
ANION GAP SERPL CALCULATED.4IONS-SCNC: 11 MMOL/L (CALC) (ref 7–17)
AST SERPL-CCNC: 32 U/L (ref 10–35)
BILIRUB SERPL-MCNC: 0.7 MG/DL (ref 0.2–1.2)
BUN SERPL-MCNC: 16 MG/DL (ref 7–25)
CALCIUM SERPL-MCNC: 9.8 MG/DL (ref 8.6–10.3)
CHLORIDE SERPL-SCNC: 105 MMOL/L (ref 98–110)
CHOLEST SERPL-MCNC: 128 MG/DL
CHOLEST/HDLC SERPL: 3.6 (CALC)
CO2 SERPL-SCNC: 24 MMOL/L (ref 20–32)
CREAT SERPL-MCNC: 0.78 MG/DL (ref 0.7–1.35)
CREAT UR-MCNC: 220 MG/DL (ref 20–320)
EGFRCR SERPLBLD CKD-EPI 2021: 101 ML/MIN/1.73M2
ERYTHROCYTE [DISTWIDTH] IN BLOOD BY AUTOMATED COUNT: 12.9 % (ref 11–15)
GLUCOSE SERPL-MCNC: 257 MG/DL (ref 65–99)
HCT VFR BLD AUTO: 42.4 % (ref 38.5–50)
HDLC SERPL-MCNC: 36 MG/DL
HGB BLD-MCNC: 14.3 G/DL (ref 13.2–17.1)
LDLC SERPL CALC-MCNC: 62 MG/DL (CALC)
MCH RBC QN AUTO: 30.6 PG (ref 27–33)
MCHC RBC AUTO-ENTMCNC: 33.7 G/DL (ref 32–36)
MCV RBC AUTO: 90.8 FL (ref 80–100)
MICROALBUMIN UR-MCNC: 1.2 MG/DL
NONHDLC SERPL-MCNC: 92 MG/DL (CALC)
PLATELET # BLD AUTO: 217 THOUSAND/UL (ref 140–400)
PMV BLD REES-ECKER: 11.9 FL (ref 7.5–12.5)
POTASSIUM SERPL-SCNC: 4.6 MMOL/L (ref 3.5–5.3)
PROT SERPL-MCNC: 7.2 G/DL (ref 6.1–8.1)
RBC # BLD AUTO: 4.67 MILLION/UL (ref 4.2–5.8)
SODIUM SERPL-SCNC: 140 MMOL/L (ref 135–146)
TRIGL SERPL-MCNC: 240 MG/DL
TSH SERPL-ACNC: 3.11 MIU/L (ref 0.4–4.5)
URATE SERPL-MCNC: 5 MG/DL (ref 4–8)
VIT B12 SERPL-MCNC: 321 PG/ML (ref 200–1100)
WBC # BLD AUTO: 8.7 THOUSAND/UL (ref 3.8–10.8)

## 2025-03-14 RX ORDER — TIRZEPATIDE 2.5 MG/.5ML
2.5 INJECTION, SOLUTION SUBCUTANEOUS
Refills: 0 | OUTPATIENT
Start: 2025-03-16

## 2025-03-16 NOTE — ASSESSMENT & PLAN NOTE
Will see if we can get Mounjaro covered by patient's insurance to treat type 2 diabetes, obesity, and ALONA

## 2025-03-25 ENCOUNTER — APPOINTMENT (OUTPATIENT)
Dept: PHARMACY | Facility: HOSPITAL | Age: 60
End: 2025-03-25
Payer: COMMERCIAL

## 2025-03-25 DIAGNOSIS — E11.9 TYPE 2 DIABETES MELLITUS WITHOUT COMPLICATION, WITHOUT LONG-TERM CURRENT USE OF INSULIN: ICD-10-CM

## 2025-03-25 DIAGNOSIS — E11.69 TYPE 2 DIABETES MELLITUS WITH OTHER SPECIFIED COMPLICATION, UNSPECIFIED WHETHER LONG TERM INSULIN USE (MULTI): Primary | ICD-10-CM

## 2025-03-25 NOTE — PROGRESS NOTES
Patient ID: Mohan Gibbons is a 59 y.o. male who presents for Obesity and Diabetes.  Pt is here for First appointment.     Referring Provider: Jasmine Joy DO  Last Visit: 3.13.25    Verbal consent to manage patient's drug therapy was obtained from patient. They were informed they may decline to participate or withdraw from participation in pharmacy services at any time.      Subjective     Medication Reconciliation:  Changed: none  Added: none  Discontinued: none    Drug Interactions  None at time of review    Medication System Management (Adherence)  Patient did NOT take medications today.   Number of missed doses in last 7 days: has not started mounjaro yet  Can patient afford prescribed medications: Yes, no issues reported    Preferred pharmacy:     [facility] [address] [facility] [address] [facility]  [address] [facility] [address]  Phone: [phone] Fax: [phone]    [facility]/pharmacy #[id number] [place], [place] - [address] [facility]  [address] [place] [address]  Phone: [phone] Fax: [phone]      [facility]  DIABETES MELLITUS TYPE 2:    Known diabetic complications: none.  Does patient follow with Endocrinology?: No     Last optometry exam: not assessed  Most recent visit in Podiatry: not assessed     Current diabetic medications include:  Mounjaro 2.5mg weekly - not started  Metformin XR 1.5G daily    Adverse Effects: none reported     Home blood glucose records (mg/dL)  Does not check    Any episodes of hypoglycemia? No, none reported .    Did patient treat episode of hypoglycemia appropriately? N/A  Does the patient have a prescription for ready-to-use Glucagon? Not on insulin     Lifestyle:   Diet: not assessed in detail  Exercise: not assessed  Illicit substances/Alcohol: none    Secondary Prevention  Aspirin? Yes  Statin? Yes  ACE-I/ARB? No  UACR/EGFR in last year?: Yes  ALBUMIN/CREATININE RATIO, RANDOM URINE   Date Value Ref Range  Status   03/13/2025 5 <30 mg/g creat Final     Comment:        The ADA defines abnormalities in albumin  excretion as follows:     Albuminuria Category        Result (mg/g creatinine)     Normal to Mildly increased   <30  Moderately increased            Severely increased           > OR = 300     The ADA recommends that at least two of three  specimens collected within a 3-6 month period be  abnormal before considering a patient to be  within a diagnostic category.       Albumin/Creatine Ratio   Date Value Ref Range Status   07/07/2023 SEE COMMENT 0.0 - 30.0 ug/mg crt Final     Comment:     One or more analytes used in this calculation   is outside of the analytical measurement range.  Calculation cannot be performed.       Pertinent PMH Review:  PMH of Pancreatitis: No  PMH of Retinopathy: No  PMH of Urinary Tract Infections: No  PMH of MTC: No  PMH of MEN2: No    Review of Systems    Objective     BP Readings from Last 4 Encounters:   03/13/25 130/86   06/26/24 120/78   05/13/24 126/78   07/07/23 128/78      There were no vitals filed for this visit.     Labs  CREATININE   Date Value Ref Range Status   03/13/2025 0.78 0.70 - 1.35 mg/dL Final     EGFR   Date Value Ref Range Status   03/13/2025 101 > OR = 60 mL/min/1.73m2 Final     SODIUM   Date Value Ref Range Status   03/13/2025 140 135 - 146 mmol/L Final     POTASSIUM   Date Value Ref Range Status   03/13/2025 4.6 3.5 - 5.3 mmol/L Final     CHLORIDE   Date Value Ref Range Status   03/13/2025 105 98 - 110 mmol/L Final     UREA NITROGEN (BUN)   Date Value Ref Range Status   03/13/2025 16 7 - 25 mg/dL Final     AST   Date Value Ref Range Status   03/13/2025 32 10 - 35 U/L Final        Lab Results   Component Value Date    BILITOT 0.7 03/13/2025    CALCIUM 9.8 03/13/2025    CO2 24 03/13/2025     03/13/2025    CREATININE 0.78 03/13/2025    GLUCOSE 257 (H) 03/13/2025    ALKPHOS 77 03/13/2025    K 4.6 03/13/2025    PROT 7.2 03/13/2025     03/13/2025     AST 32 03/13/2025    ALT 46 03/13/2025    BUN 16 03/13/2025    ANIONGAP 11 03/13/2025    ALBUMIN 4.3 03/13/2025    GFRMALE >90 07/07/2023     Lab Results   Component Value Date    TRIG 240 (H) 03/13/2025    CHOL 128 03/13/2025    LDLCALC 62 03/13/2025    HDL 36 (L) 03/13/2025     Lab Results   Component Value Date    HGBA1C 8.9 (A) 03/13/2025     ALBUMIN/CREATININE RATIO, RANDOM URINE   Date Value Ref Range Status   03/13/2025 5 <30 mg/g creat Final     Comment:        The ADA defines abnormalities in albumin  excretion as follows:     Albuminuria Category        Result (mg/g creatinine)     Normal to Mildly increased   <30  Moderately increased            Severely increased           > OR = 300     The ADA recommends that at least two of three  specimens collected within a 3-6 month period be  abnormal before considering a patient to be  within a diagnostic category.       Albumin/Creatine Ratio   Date Value Ref Range Status   07/07/2023 SEE COMMENT 0.0 - 30.0 ug/mg crt Final     Comment:     One or more analytes used in this calculation   is outside of the analytical measurement range.  Calculation cannot be performed.       Current Outpatient Medications   Medication Instructions    allopurinol (ZYLOPRIM) 100 mg, oral, Daily    aspirin 81 mg    atorvastatin (LIPITOR) 40 mg, oral, Daily    cholecalciferol (VITAMIN D-3) 50 mcg    ciclopirox (Penlac) 8 % solution Topical, Nightly, , remove buildup with alcohol swab once weekly    fluorometholone (FML) 0.1 % ophthalmic suspension 1 drop    loratadine (CLARITIN) 10 mg    metFORMIN XR (GLUCOPHAGE-XR) 1,000 mg, oral, 2 times daily (morning and late afternoon), Do not crush, chew, or split.    Mounjaro 2.5 mg, subcutaneous, Weekly    nebivolol (BYSTOLIC) 10 mg, oral, Daily        Assessment/Plan   Problem List Items Addressed This Visit             ICD-10-CM    Type 2 diabetes mellitus with other specified complication, unspecified whether long term insulin use  (Multi) - Primary E11.69     Is pt A1c at goal? No, 8.9%  Has not started Mounarjo yet - was out of town. Will start this week.   Continue titration of metformin to max 2G daily.    Medication Changes:  START  Mounjaro 2.5mg weekly  INCREASE  Metformin XR to 1G bid      PATIENT EDUCATION/GOALS  Average < 150mg/dL  Time in range > 70%  Fasting B - 130 mg/dL  Postprandial BG: less than 180 mg/dL  A1c: less than 7%    Low and High Blood Sugar  Symptoms of low blood sugar include: Fast heartbeat, shaking, sweating, nervousness or anxiety, irritability or confusion, and/or dizziness.  Symptoms of high blood sugar include: Feeling more thirsty than usual, urinating often, losing weight without trying, presence of ketones in the urine, feeling tired and weak, feeling irritable or having other mood changes, having blurry vision, and/or having slow-healing sores.  If you experience symptoms of low blood sugar (blood sugar less than 70 mg/dL) follow the rule of 15 by eating ~15 g of simple carbohydrates (examples: half cup juice, 3-4 glucose tabs, 1 tablespoon of sugar, honey, or syrup).    Dietary Recommendations  The a lower carbohydrate or Mediterranean diet is often recommended for patients with elevated blood glucose.   Food recommendations:   Focus on whole foods, with as few ingredients as possible.   Focus on lower glycemic foods (GI of 55 or less): this includes most fruits and vegetables, beans, minimally processed grains, dairy, nuts and seeds.  Minimize moderate glycemic index (GI 56 to 69) foods: White and sweet potatoes, corn, white rice, couscous, breakfast cereals such as Cream of Wheat.  Avoid high glycemic index (GI of 70 or higher): White bread, rice cakes, most crackers, bagels, cakes, doughnuts, croissants, most packaged breakfast cereals.  Include 1-2 servings weekly fatty fish that are low in mercury such as salmon, mackerel, anchovies, sardines, and herring. Avoid frying fish. Bake, steam, or  poach.   Avoid trans-fats (fried foods, microwave popcorn, margarine, etc.).   Use oils such as coconut oil, olive oil, avocado oil, or ghee. Select oils appropriate for the temperature you are cooking at.   Avoid processed meats (such as deli meat), canned soups, soy sauce, and fried foods these are all high in added sodium.   The recommended sodium intake for most people is around 2,300 mg/day (too little or too much salt can affect blood pressure).   It is ok to add salt to suit your taste to fresh whole foods (such as vegetables) that you are cooking at home.   Include 4-5 servings daily of both fruits and vegetables. Fruits and vegetables are a good source of fiber, potassium, and magnesium which help support healthy blood pressure.  Focus on eating a variety of colors each day (eating the rainbow- such as red peppers, orange carrots, yellow beans, green lettuce, blue/purple berries, white/brown onions).   Avoid foods with added sugars (goal of <10 grams/serving of added sugar). Limit added sugars to less than 24 (women)-36 (men) grams daily.  Beverage recommendations:    Avoid caffeinated drinks such as coffee, energy drinks, and soda (both regular and diet). Consider sparkling water, water with lemon (or other fruit), or black, oolong, or green tea (prior to noon).   Avoid regular consumption of alcohol. If it is a special occasion the recommended alcohol intake for a male is 2 (men) or 1(women) or less drinks per day.  Alcohol consumption may place people with diabetes at increased risk for delayed hypoglycemia (low blood sugar) especially if taking other medications that may cause hypoglycemia such as insulin.     Lifestyle Recommendations  Avoid tobacco products (including chewing tobacco and vaping).   Continue to integrate regular movement and enjoyable forms of exercise into your weekly routine. The recommended exercise regimen is 150 minutes per week (for example 5 days per week, 30 minutes per day).    Consider walking for 10-15 minutes after each meal in order to help control blood sugar.   Manage/reduce stress  Consider therapy, mindfulness, breathing exercises (4-7-8 breath), meditation, yoga, journaling, addressing/removing stressors, etc.   Sleep  Goal of 7-9 hours of restful sleep nightly.        Mounjaro Education:  Counseled patient on Mounjaro MOA, expectations, side effects, duration of therapy, administration, and monitoring parameters.  Counseled patient on the benefits of GLP-1ra, such as cardiovascular risk reduction, glycemic control, and weight loss potential.  Provided detailed dosing and administration counseling to ensure proper technique.   Reviewed Mounjaro titration schedule, starting with 2.5 mg once weekly to 5 mg, 7.5mg, 10mg, 12.5mg and if tolerated 15 mg.  Reviewed storage requirements of Mounjaro when not in use, and when to administer the medication if a dose is missed.  Discussed risks of GLP1ra including risk of pancreatitis, MTC and worsening of DR  Advised patient that they may experience improved satiety after meals and portion sizes of meals may be reduced as doses of Mounjaro increase.            Other Visit Diagnoses         Codes    Type 2 diabetes mellitus without complication, without long-term current use of insulin (Multi)     E11.9    Relevant Orders    Referral to Clinical Pharmacy            Immunizations needed: PCV, RSV    Labs ordered:  none     Referrals:  none     Follow-up: 6 weeks     Patient was provided with PharmD phone number and encouraged to reach out with any questions or concerns Prior to next appointment or ask provider for another pharmacy referral.    Time spent with pt: Total length of time 20 (minutes) of the encounter and more than 50% was spent counseling the patient.  Patient is NOT followed by CCM.     Thank you for allowing to take part in the care of this patient.    Erica Carcamo, PharmD, IVAN  Clinical Pharmacist  [phone]    Continue  all meds under the continuation of care with the referring provider and clinical pharmacy team.    Verbal consent to manage patient's drug therapy was obtained from the patient. They were informed they may decline to participate or withdraw from participation in pharmacy services at any time.

## 2025-03-25 NOTE — ASSESSMENT & PLAN NOTE
Is pt A1c at goal? No, 8.9%  Has not started Mounarjo yet - was out of town. Will start this week.   Continue titration of metformin to max 2G daily.    Medication Changes:  START  Mounjaro 2.5mg weekly  INCREASE  Metformin XR to 1G bid      PATIENT EDUCATION/GOALS  Average < 150mg/dL  Time in range > 70%  Fasting B - 130 mg/dL  Postprandial BG: less than 180 mg/dL  A1c: less than 7%    Low and High Blood Sugar  Symptoms of low blood sugar include: Fast heartbeat, shaking, sweating, nervousness or anxiety, irritability or confusion, and/or dizziness.  Symptoms of high blood sugar include: Feeling more thirsty than usual, urinating often, losing weight without trying, presence of ketones in the urine, feeling tired and weak, feeling irritable or having other mood changes, having blurry vision, and/or having slow-healing sores.  If you experience symptoms of low blood sugar (blood sugar less than 70 mg/dL) follow the rule of 15 by eating ~15 g of simple carbohydrates (examples: half cup juice, 3-4 glucose tabs, 1 tablespoon of sugar, honey, or syrup).    Dietary Recommendations  The a lower carbohydrate or Mediterranean diet is often recommended for patients with elevated blood glucose.   Food recommendations:   Focus on whole foods, with as few ingredients as possible.   Focus on lower glycemic foods (GI of 55 or less): this includes most fruits and vegetables, beans, minimally processed grains, dairy, nuts and seeds.  Minimize moderate glycemic index (GI 56 to 69) foods: White and sweet potatoes, corn, white rice, couscous, breakfast cereals such as Cream of Wheat.  Avoid high glycemic index (GI of 70 or higher): White bread, rice cakes, most crackers, bagels, cakes, doughnuts, croissants, most packaged breakfast cereals.  Include 1-2 servings weekly fatty fish that are low in mercury such as salmon, mackerel, anchovies, sardines, and herring. Avoid frying fish. Bake, steam, or poach.   Avoid trans-fats (fried  foods, microwave popcorn, margarine, etc.).   Use oils such as coconut oil, olive oil, avocado oil, or ghee. Select oils appropriate for the temperature you are cooking at.   Avoid processed meats (such as deli meat), canned soups, soy sauce, and fried foods these are all high in added sodium.   The recommended sodium intake for most people is around 2,300 mg/day (too little or too much salt can affect blood pressure).   It is ok to add salt to suit your taste to fresh whole foods (such as vegetables) that you are cooking at home.   Include 4-5 servings daily of both fruits and vegetables. Fruits and vegetables are a good source of fiber, potassium, and magnesium which help support healthy blood pressure.  Focus on eating a variety of colors each day (eating the rainbow- such as red peppers, orange carrots, yellow beans, green lettuce, blue/purple berries, white/brown onions).   Avoid foods with added sugars (goal of <10 grams/serving of added sugar). Limit added sugars to less than 24 (women)-36 (men) grams daily.  Beverage recommendations:    Avoid caffeinated drinks such as coffee, energy drinks, and soda (both regular and diet). Consider sparkling water, water with lemon (or other fruit), or black, oolong, or green tea (prior to noon).   Avoid regular consumption of alcohol. If it is a special occasion the recommended alcohol intake for a male is 2 (men) or 1(women) or less drinks per day.  Alcohol consumption may place people with diabetes at increased risk for delayed hypoglycemia (low blood sugar) especially if taking other medications that may cause hypoglycemia such as insulin.     Lifestyle Recommendations  Avoid tobacco products (including chewing tobacco and vaping).   Continue to integrate regular movement and enjoyable forms of exercise into your weekly routine. The recommended exercise regimen is 150 minutes per week (for example 5 days per week, 30 minutes per day).   Consider walking for 10-15  minutes after each meal in order to help control blood sugar.   Manage/reduce stress  Consider therapy, mindfulness, breathing exercises (4-7-8 breath), meditation, yoga, journaling, addressing/removing stressors, etc.   Sleep  Goal of 7-9 hours of restful sleep nightly.        Mounjaro Education:  Counseled patient on Mounjaro MOA, expectations, side effects, duration of therapy, administration, and monitoring parameters.  Counseled patient on the benefits of GLP-1ra, such as cardiovascular risk reduction, glycemic control, and weight loss potential.  Provided detailed dosing and administration counseling to ensure proper technique.   Reviewed Mounjaro titration schedule, starting with 2.5 mg once weekly to 5 mg, 7.5mg, 10mg, 12.5mg and if tolerated 15 mg.  Reviewed storage requirements of Mounjaro when not in use, and when to administer the medication if a dose is missed.  Discussed risks of GLP1ra including risk of pancreatitis, MTC and worsening of DR  Advised patient that they may experience improved satiety after meals and portion sizes of meals may be reduced as doses of Mounjaro increase.

## 2025-03-25 NOTE — Clinical Note
You see him 4/14. He is starting mounjaro today so you should be able to increase to 5mg if he is tolerating. I made my f/u with him a few weeks after that to see about going up to 7.5

## 2025-03-26 DIAGNOSIS — E11.69 TYPE 2 DIABETES MELLITUS WITH OTHER SPECIFIED COMPLICATION, UNSPECIFIED WHETHER LONG TERM INSULIN USE (MULTI): Primary | ICD-10-CM

## 2025-03-31 RX ORDER — METFORMIN HYDROCHLORIDE 500 MG/1
1000 TABLET, EXTENDED RELEASE ORAL
Qty: 360 TABLET | Refills: 3 | Status: SHIPPED | OUTPATIENT
Start: 2025-03-31

## 2025-04-14 ENCOUNTER — APPOINTMENT (OUTPATIENT)
Dept: PRIMARY CARE | Facility: CLINIC | Age: 60
End: 2025-04-14
Payer: COMMERCIAL

## 2025-04-14 VITALS
RESPIRATION RATE: 20 BRPM | OXYGEN SATURATION: 96 % | HEART RATE: 76 BPM | DIASTOLIC BLOOD PRESSURE: 74 MMHG | BODY MASS INDEX: 39.22 KG/M2 | WEIGHT: 274 LBS | TEMPERATURE: 97.9 F | SYSTOLIC BLOOD PRESSURE: 118 MMHG | HEIGHT: 70 IN

## 2025-04-14 DIAGNOSIS — E66.01 CLASS 2 SEVERE OBESITY DUE TO EXCESS CALORIES WITH SERIOUS COMORBIDITY AND BODY MASS INDEX (BMI) OF 39.0 TO 39.9 IN ADULT: ICD-10-CM

## 2025-04-14 DIAGNOSIS — M10.9 GOUT, UNSPECIFIED CAUSE, UNSPECIFIED CHRONICITY, UNSPECIFIED SITE: ICD-10-CM

## 2025-04-14 DIAGNOSIS — Z00.00 ANNUAL PHYSICAL EXAM: Primary | ICD-10-CM

## 2025-04-14 DIAGNOSIS — Z96.1 PSEUDOPHAKIA OF BOTH EYES: ICD-10-CM

## 2025-04-14 DIAGNOSIS — G47.33 OSA (OBSTRUCTIVE SLEEP APNEA): ICD-10-CM

## 2025-04-14 DIAGNOSIS — B35.1 TOENAIL FUNGUS: ICD-10-CM

## 2025-04-14 DIAGNOSIS — I10 PRIMARY HYPERTENSION: ICD-10-CM

## 2025-04-14 DIAGNOSIS — E66.812 CLASS 2 SEVERE OBESITY DUE TO EXCESS CALORIES WITH SERIOUS COMORBIDITY AND BODY MASS INDEX (BMI) OF 39.0 TO 39.9 IN ADULT: ICD-10-CM

## 2025-04-14 DIAGNOSIS — H18.513 FUCHS' CORNEAL DYSTROPHY OF BOTH EYES: ICD-10-CM

## 2025-04-14 DIAGNOSIS — E78.2 MIXED HYPERLIPIDEMIA: ICD-10-CM

## 2025-04-14 DIAGNOSIS — Z12.11 ENCOUNTER FOR SCREENING FOR MALIGNANT NEOPLASM OF COLON: ICD-10-CM

## 2025-04-14 DIAGNOSIS — E11.9 TYPE 2 DIABETES MELLITUS WITHOUT COMPLICATION, WITHOUT LONG-TERM CURRENT USE OF INSULIN: ICD-10-CM

## 2025-04-14 DIAGNOSIS — K21.9 GASTROESOPHAGEAL REFLUX DISEASE WITHOUT ESOPHAGITIS: ICD-10-CM

## 2025-04-14 DIAGNOSIS — K75.81 NASH (NONALCOHOLIC STEATOHEPATITIS): ICD-10-CM

## 2025-04-14 PROBLEM — E55.9 VITAMIN D DEFICIENCY: Status: RESOLVED | Noted: 2023-03-14 | Resolved: 2025-04-14

## 2025-04-14 PROCEDURE — 1036F TOBACCO NON-USER: CPT

## 2025-04-14 PROCEDURE — 3074F SYST BP LT 130 MM HG: CPT

## 2025-04-14 PROCEDURE — 99396 PREV VISIT EST AGE 40-64: CPT

## 2025-04-14 PROCEDURE — 3078F DIAST BP <80 MM HG: CPT

## 2025-04-14 PROCEDURE — 3052F HG A1C>EQUAL 8.0%<EQUAL 9.0%: CPT

## 2025-04-14 PROCEDURE — 3008F BODY MASS INDEX DOCD: CPT

## 2025-04-14 RX ORDER — TIRZEPATIDE 2.5 MG/.5ML
2.5 INJECTION, SOLUTION SUBCUTANEOUS WEEKLY
Qty: 2 ML | Refills: 2 | Status: SHIPPED | OUTPATIENT
Start: 2025-04-14

## 2025-04-14 ASSESSMENT — PROMIS GLOBAL HEALTH SCALE
RATE_AVERAGE_PAIN: 0
RATE_QUALITY_OF_LIFE: GOOD
CARRYOUT_PHYSICAL_ACTIVITIES: COMPLETELY
RATE_AVERAGE_FATIGUE: MILD
RATE_SOCIAL_SATISFACTION: GOOD
RATE_MENTAL_HEALTH: GOOD
RATE_GENERAL_HEALTH: FAIR
CARRYOUT_SOCIAL_ACTIVITIES: GOOD
RATE_PHYSICAL_HEALTH: FAIR
EMOTIONAL_PROBLEMS: RARELY

## 2025-04-14 NOTE — ASSESSMENT & PLAN NOTE
Congratulated patient on his impressive 11 pound weight loss over the last month with the help of Mounjaro

## 2025-04-14 NOTE — PROGRESS NOTES
"Subjective   Mohan Gibbons is a 59 y.o. male     ANNUAL PHYSICAL EXAM    Concerns:  - At his last visit a month ago we increased his metformin from 500 mg once daily to 1000 mg twice daily.  He does think it caused some loose stools at the beginning but then his body got used to it.  We also started him on Mounjaro 2.5 mg weekly for diabetes and weight loss.  He is taking his fourth shot tomorrow.  He has been tolerating it pretty well.  He notes a little bit of constipation now but it is not that bad.  He sometimes feels \"a pit in his stomach\".  He states that he is only able to eat about half as much as he used to be able to eat now that he is on the medicine which he is happy about.  He has lost 11 pounds in the last month.  He has been checking his blood sugars once daily in the morning before eating breakfast.  Over the last month they have ranged from 155-190.  He states that in the last week they have been better, averaging more from 155-165.    Specialists that pt follows with: sleep med, ophtho    Preventative:  - Immunizations: UTD on influenza, covid x6, Tdap (), shingrix, prevnar  - Colorectal cancer screenin had polyps, repeat was due 2023, overdue (patient thinks he may have had one about 2 years ago but I cannot find in the computer system.  He is going to call his gastroenterologist office and double check when his last colonoscopy was)  - Low dose chest CT: Quit smoking cigarettes 27 years ago. Smoked 1 PPD. Doesn't qualify for screening CT  - Chlamydia/Gonorrhea testing: Not interested   - 1 time Hep C testing: done  - 1 time HIV testing: done  - Dental care: Needs  - Vision care: UTD    Lifestyle:  - Occupation: He semi-retired recently from his full-time position at [employer].  He now is doing a part-time role in consulting  - Diet: Well balanced  - Exercise: Infrequently  - Alcohol/tobacco/drugs: Quit smoking cigarettes 27 years ago. Smoked 1 PPD. Occasional social alcohol use. No " current tobacco/nicotine/drugs  - Mood: Good    Active Problem List      Comprehensive Medical/Surgical/Social/Family History  Past Medical History:   Diagnosis Date    Endothelial corneal dystrophy, unspecified eye     Fuchs' corneal dystrophy    Other conditions influencing health status     Normal prostate specific antigen (PSA) level    Other conditions influencing health status     Normal colonoscopy    Personal history of other diseases of the circulatory system     History of hypertension    Personal history of other diseases of the digestive system     History of gastroesophageal reflux (GERD)    Personal history of other diseases of the musculoskeletal system and connective tissue     History of gout    Personal history of other diseases of the nervous system and sense organs     History of carpal tunnel syndrome    Personal history of other endocrine, nutritional and metabolic disease 04/05/2022    History of obesity    Prediabetes 07/19/2022    Prediabetes     Past Surgical History:   Procedure Laterality Date    OTHER SURGICAL HISTORY  08/07/2020    Eye surgery    OTHER SURGICAL HISTORY  07/30/2019    Appendectomy     Social History     Social History Narrative    Not on file       Allergies and Medications  Patient has no known allergies.  Current Outpatient Medications on File Prior to Visit   Medication Sig Dispense Refill    allopurinol (Zyloprim) 100 mg tablet Take 1 tablet (100 mg) by mouth once daily. 90 tablet 3    aspirin 81 mg EC tablet Take 1 tablet (81 mg) by mouth.      atorvastatin (Lipitor) 40 mg tablet Take 1 tablet by mouth once daily 90 tablet 3    cholecalciferol (Vitamin D-3) 50 MCG (2000 UT) tablet Take 1 tablet (50 mcg) by mouth.      ciclopirox (Penlac) 8 % solution Apply topically once daily at bedtime. , remove buildup with alcohol swab once weekly 6.6 mL 11    fluorometholone (FML) 0.1 % ophthalmic suspension Administer 1 drop into both eyes.      loratadine (Claritin) 10 mg  "tablet Take 1 tablet (10 mg) by mouth.      metFORMIN  mg 24 hr tablet Take 2 tablets (1,000 mg) by mouth 2 times daily (morning and late afternoon). Do not crush, chew, or split. 360 tablet 3    nebivolol (Bystolic) 10 mg tablet Take 1 tablet (10 mg) by mouth once daily. 90 tablet 3    [DISCONTINUED] tirzepatide (Mounjaro) 2.5 mg/0.5 mL pen injector Inject 2.5 mg under the skin 1 (one) time per week. 2 mL 0     No current facility-administered medications on file prior to visit.       Objective   /74   Pulse 76   Temp 36.6 °C (97.9 °F)   Resp 20   Ht 1.778 m (5' 10\")   Wt 124 kg (274 lb)   SpO2 96%   BMI 39.31 kg/m²    PHYSICAL EXAM  Gen: Well appearing, in NAD  Eyes: EOMI  HEENT: MMM. TMs normal. Throat normal.  Heart: RRR, no murmurs  Lungs: No increased work of breathing, CTAB, on RA  GI: Soft, NTND, no guarding or rebound  Extremities: WWP, cap refill <2sec, no pitting edema in LE b/l  Neuro: Alert, symmetrical facies, moves all extremities equally  Skin: No rashes or lesions  Psych: Appropriate mood and affect    Assessment/Plan   - Reviewed Social Determinants of health with patient. Discussed healthy lifestyle, including 150 minutes of physical activity per week.  - Ordered/Reviewed baseline labwork   - Immunizations up to date  - Follow up in 1 year for next annual physical or sooner for acute concerns    Problem List Items Addressed This Visit       Primary hypertension    Overview     Well-controlled on nebivolol 10 mg daily         Fuchs' corneal dystrophy of both eyes    Overview     Follows with ophthalmologist.  S/p b/l corneal transplant         GERD (gastroesophageal reflux disease)    Gout    Overview     Well-controlled and no recent flares on allopurinol 100 mg daily as prophylaxis         Class 2 severe obesity due to excess calories with serious comorbidity and body mass index (BMI) of 39.0 to 39.9 in adult    Overview     Counseled on healthy diet and regular exercise.  On " Mounjaro for type 2 diabetes and obesity         Current Assessment & Plan     Congratulated patient on his impressive 11 pound weight loss over the last month with the help of Mounjaro         Hyperlipemia    Overview     On atorvastatin 40 mg daily         LING (nonalcoholic steatohepatitis)    Overview     No transaminitis         ALONA (obstructive sleep apnea)    Overview     Follows with sleep medicine.  On CPAP         Type 2 diabetes mellitus without complication, without long-term current use of insulin    Current Assessment & Plan     A1c 3/13/2025 was 8.9%.  Currently on metformin 1000 mg twice daily and Mounjaro 2.5 mg weekly.  He has had a few minor side effects and he is doing a great job with weight loss and his fasting blood sugars are improving, so I will keep him on the lowest dose of Mounjaro 2.5 mg for at least 1 more month.  In a few weeks he has another phone call appointment with my clinical pharmacist colleague so at that time we will decide if he should go up to the next higher dose, 5 mg.  Annual diabetic eye exam is up-to-date.  Annual urine microalbumin was performed and was normal.  He is on a statin.  He is not on an ACE inhibitor/ARB.  He is on Nebivolol for hypertension.  Will consider switching to losartan in the future.  Repeat A1c in 2 months, mid June 2025, this will be 3 months since last A1c.  Follow-up with me in 3 months.  Continue monthly phone calls with clinical pharmacist.         Relevant Medications    tirzepatide (Mounjaro) 2.5 mg/0.5 mL pen injector    Other Relevant Orders    Hemoglobin A1C    Follow Up In [facility] - PCP - Established    Pseudophakia of both eyes    Overview     Follows with ophthalmologist         Toenail fungus    Overview     Using Penlac solution          Other Visit Diagnoses       Annual physical exam    -  Primary    Encounter for screening for malignant neoplasm of colon        Relevant Orders    Colonoscopy Screening; Average Risk  Patient          Jasmine Joy D.O.  Family Medicine Physician  [facility]  [address] [place] [address] [phone]    This note has been transcribed using Dragon voice recognition system and there is a possibility of unintentional typing misprints.

## 2025-04-14 NOTE — ASSESSMENT & PLAN NOTE
A1c 3/13/2025 was 8.9%.  Currently on metformin 1000 mg twice daily and Mounjaro 2.5 mg weekly.  He has had a few minor side effects and he is doing a great job with weight loss and his fasting blood sugars are improving, so I will keep him on the lowest dose of Mounjaro 2.5 mg for at least 1 more month.  In a few weeks he has another phone call appointment with my clinical pharmacist colleague so at that time we will decide if he should go up to the next higher dose, 5 mg.  Annual diabetic eye exam is up-to-date.  Annual urine microalbumin was performed and was normal.  He is on a statin.  He is not on an ACE inhibitor/ARB.  He is on Nebivolol for hypertension.  Will consider switching to losartan in the future.  Repeat A1c in 2 months, mid June 2025, this will be 3 months since last A1c.  Follow-up with me in 3 months.  Continue monthly phone calls with clinical pharmacist.

## 2025-05-09 ENCOUNTER — APPOINTMENT (OUTPATIENT)
Dept: PHARMACY | Facility: HOSPITAL | Age: 60
End: 2025-05-09
Payer: COMMERCIAL

## 2025-05-09 DIAGNOSIS — E11.9 TYPE 2 DIABETES MELLITUS WITHOUT COMPLICATION, WITHOUT LONG-TERM CURRENT USE OF INSULIN: ICD-10-CM

## 2025-05-09 RX ORDER — TIRZEPATIDE 5 MG/.5ML
5 INJECTION, SOLUTION SUBCUTANEOUS WEEKLY
Qty: 2 ML | Refills: 2 | Status: SHIPPED | OUTPATIENT
Start: 2025-05-09

## 2025-05-09 NOTE — PROGRESS NOTES
Patient ID: Mohan Gibbons is a 59 y.o. male who presents for Diabetes.  Pt is here for Follow Up appointment.     Referring Provider: Jasmine Joy DO  Last Visit: 4.14.25    Verbal consent to manage patient's drug therapy was obtained from patient. They were informed they may decline to participate or withdraw from participation in pharmacy services at any time.      Subjective     Drug Interactions  None at time of review    Medication System Management (Adherence)  Patient did NOT take medications today.   Number of missed doses in last 7 days: has not started mounjaro yet  Can patient afford prescribed medications: Yes, no issues reported    Preferred pharmacy:     [facility] [address] [place], [place] - [address] [facility]  [address] [facility] [address]  Phone: [phone] Fax: [phone]    [id number] - [place], [place] - [address] [facility] [address] [facility]  [address] [place] [address]  Phone: [phone] Fax: [phone]      [facility]  DIABETES MELLITUS TYPE 2:    Known diabetic complications: none.  Does patient follow with Endocrinology?: No     Last optometry exam: not assessed  Most recent visit in Podiatry: not assessed     Current diabetic medications include:  Mounjaro 2.5mg weekly -7 doses  Metformin XR 1G twice daily     Adverse Effects: none reported     Home blood glucose records (mg/dL)  FBG: 160-185 in March, more recently has been 125-130 or below; 114 lowest today    Any episodes of hypoglycemia? No, none reported.    Did patient treat episode of hypoglycemia appropriately? N/A  Does the patient have a prescription for ready-to-use Glucagon? 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 Plans to  use the new free time and nice weather to do yard work, housework, etc.   Illicit substances/Alcohol: craft beer on the weekends, feeling like he takes much longer to drink now    Secondary Prevention  Aspirin? Yes  Statin? Yes  ACE-I/ARB? No  UACR/EGFR in last year?: Yes  ALBUMIN/CREATININE RATIO, RANDOM URINE   Date Value Ref Range Status   03/13/2025 5 <30 mg/g creat Final     Comment:        The ADA defines abnormalities in albumin  excretion as follows:     Albuminuria Category        Result (mg/g creatinine)     Normal to Mildly increased   <30  Moderately increased            Severely increased           > OR = 300     The ADA recommends that at least two of three  specimens collected within a 3-6 month period be  abnormal before considering a patient to be  within a diagnostic category.       Albumin/Creatine Ratio   Date Value Ref Range Status   07/07/2023 SEE COMMENT 0.0 - 30.0 ug/mg crt Final     Comment:     One or more analytes used in this calculation   is outside of the analytical measurement range.  Calculation cannot be performed.       Pertinent PMH Review:  PMH of Pancreatitis: No  PMH of Retinopathy: No  PMH of Urinary Tract Infections: No  PMH of MTC: No  PMH of MEN2: No    Review of Systems    Objective     BP Readings from Last 4 Encounters:   04/14/25 118/74   03/13/25 130/86   06/26/24 120/78   05/13/24 126/78      There were no vitals filed for this visit.     Labs  CREATININE   Date Value Ref Range Status   03/13/2025 0.78 0.70 - 1.35 mg/dL Final     EGFR   Date Value Ref Range Status   03/13/2025 101 > OR = 60 mL/min/1.73m2 Final     SODIUM   Date Value Ref Range Status   03/13/2025 140 135 - 146 mmol/L Final     POTASSIUM   Date Value Ref Range Status   03/13/2025 4.6 3.5 - 5.3 mmol/L Final     CHLORIDE   Date Value Ref Range Status   03/13/2025 105 98 - 110 mmol/L Final     UREA NITROGEN (BUN)   Date Value Ref Range Status   03/13/2025 16 7 - 25 mg/dL Final     AST   Date Value  Ref Range Status   03/13/2025 32 10 - 35 U/L Final        Lab Results   Component Value Date    BILITOT 0.7 03/13/2025    CALCIUM 9.8 03/13/2025    CO2 24 03/13/2025     03/13/2025    CREATININE 0.78 03/13/2025    GLUCOSE 257 (H) 03/13/2025    ALKPHOS 77 03/13/2025    K 4.6 03/13/2025    PROT 7.2 03/13/2025     03/13/2025    AST 32 03/13/2025    ALT 46 03/13/2025    BUN 16 03/13/2025    ANIONGAP 11 03/13/2025    ALBUMIN 4.3 03/13/2025    GFRMALE >90 07/07/2023     Lab Results   Component Value Date    TRIG 240 (H) 03/13/2025    CHOL 128 03/13/2025    LDLCALC 62 03/13/2025    HDL 36 (L) 03/13/2025     Lab Results   Component Value Date    HGBA1C 8.9 (A) 03/13/2025     ALBUMIN/CREATININE RATIO, RANDOM URINE   Date Value Ref Range Status   03/13/2025 5 <30 mg/g creat Final     Comment:        The ADA defines abnormalities in albumin  excretion as follows:     Albuminuria Category        Result (mg/g creatinine)     Normal to Mildly increased   <30  Moderately increased            Severely increased           > OR = 300     The ADA recommends that at least two of three  specimens collected within a 3-6 month period be  abnormal before considering a patient to be  within a diagnostic category.       Albumin/Creatine Ratio   Date Value Ref Range Status   07/07/2023 SEE COMMENT 0.0 - 30.0 ug/mg crt Final     Comment:     One or more analytes used in this calculation   is outside of the analytical measurement range.  Calculation cannot be performed.       Current Outpatient Medications   Medication Instructions    allopurinol (ZYLOPRIM) 100 mg, oral, Daily    aspirin 81 mg    atorvastatin (LIPITOR) 40 mg, oral, Daily    cholecalciferol (VITAMIN D-3) 50 mcg    ciclopirox (Penlac) 8 % solution Topical, Nightly, , remove buildup with alcohol swab once weekly    fluorometholone (FML) 0.1 % ophthalmic suspension 1 drop    loratadine (CLARITIN) 10 mg    metFORMIN XR (GLUCOPHAGE-XR) 1,000 mg, oral, 2 times daily  (morning and late afternoon), Do not crush, chew, or split.    Mounjaro 5 mg, subcutaneous, Weekly    nebivolol (BYSTOLIC) 10 mg, oral, Daily        Assessment/Plan   Problem List Items Addressed This Visit           ICD-10-CM    Type 2 diabetes mellitus without complication, without long-term current use of insulin E11.9    Is pt A1c at goal? No, 8.9%  Tolerating current regimen. Has started checking sugars and they have been improving over the last month. Will increase Mounjaro for additional control.    Medication Changes:  INCREASE  Mounjaro to 5mg weekly  CONTINUE  Metformin XR to 1G twice daily       PATIENT EDUCATION/GOALS  Average < 150mg/dL  Time in range > 70%  Fasting B - 130 mg/dL  Postprandial BG: less than 180 mg/dL  A1c: less than 7%    Low and High Blood Sugar  Symptoms of low blood sugar include: Fast heartbeat, shaking, sweating, nervousness or anxiety, irritability or confusion, and/or dizziness.  Symptoms of high blood sugar include: Feeling more thirsty than usual, urinating often, losing weight without trying, presence of ketones in the urine, feeling tired and weak, feeling irritable or having other mood changes, having blurry vision, and/or having slow-healing sores.  If you experience symptoms of low blood sugar (blood sugar less than 70 mg/dL) follow the rule of 15 by eating ~15 g of simple carbohydrates (examples: half cup juice, 3-4 glucose tabs, 1 tablespoon of sugar, honey, or syrup).    Dietary Recommendations  The a lower carbohydrate or Mediterranean diet is often recommended for patients with elevated blood glucose.   Food recommendations:   Focus on whole foods, with as few ingredients as possible.   Focus on lower glycemic foods (GI of 55 or less): this includes most fruits and vegetables, beans, minimally processed grains, dairy, nuts and seeds.  Minimize moderate glycemic index (GI 56 to 69) foods: White and sweet potatoes, corn, white rice, couscous, breakfast cereals such  as Cream of Wheat.  Avoid high glycemic index (GI of 70 or higher): White bread, rice cakes, most crackers, bagels, cakes, doughnuts, croissants, most packaged breakfast cereals.  Include 1-2 servings weekly fatty fish that are low in mercury such as salmon, mackerel, anchovies, sardines, and herring. Avoid frying fish. Bake, steam, or poach.   Avoid trans-fats (fried foods, microwave popcorn, margarine, etc.).   Use oils such as coconut oil, olive oil, avocado oil, or ghee. Select oils appropriate for the temperature you are cooking at.   Avoid processed meats (such as deli meat), canned soups, soy sauce, and fried foods these are all high in added sodium.   The recommended sodium intake for most people is around 2,300 mg/day (too little or too much salt can affect blood pressure).   It is ok to add salt to suit your taste to fresh whole foods (such as vegetables) that you are cooking at home.   Include 4-5 servings daily of both fruits and vegetables. Fruits and vegetables are a good source of fiber, potassium, and magnesium which help support healthy blood pressure.  Focus on eating a variety of colors each day (eating the rainbow- such as red peppers, orange carrots, yellow beans, green lettuce, blue/purple berries, white/brown onions).   Avoid foods with added sugars (goal of <10 grams/serving of added sugar). Limit added sugars to less than 24 (women)-36 (men) grams daily.  Beverage recommendations:    Avoid caffeinated drinks such as coffee, energy drinks, and soda (both regular and diet). Consider sparkling water, water with lemon (or other fruit), or black, oolong, or green tea (prior to noon).   Avoid regular consumption of alcohol. If it is a special occasion the recommended alcohol intake for a male is 2 (men) or 1(women) or less drinks per day.  Alcohol consumption may place people with diabetes at increased risk for delayed hypoglycemia (low blood sugar) especially if taking other medications that may  cause hypoglycemia such as insulin.     Lifestyle Recommendations  Avoid tobacco products (including chewing tobacco and vaping).   Continue to integrate regular movement and enjoyable forms of exercise into your weekly routine. The recommended exercise regimen is 150 minutes per week (for example 5 days per week, 30 minutes per day).   Consider walking for 10-15 minutes after each meal in order to help control blood sugar.   Manage/reduce stress  Consider therapy, mindfulness, breathing exercises (4-7-8 breath), meditation, yoga, journaling, addressing/removing stressors, etc.   Sleep  Goal of 7-9 hours of restful sleep nightly.        Mounjaro Education:  Counseled patient on Mounjaro MOA, expectations, side effects, duration of therapy, administration, and monitoring parameters.  Counseled patient on the benefits of GLP-1ra, such as cardiovascular risk reduction, glycemic control, and weight loss potential.  Provided detailed dosing and administration counseling to ensure proper technique.   Reviewed Mounjaro titration schedule, starting with 2.5 mg once weekly to 5 mg, 7.5mg, 10mg, 12.5mg and if tolerated 15 mg.  Reviewed storage requirements of Mounjaro when not in use, and when to administer the medication if a dose is missed.  Discussed risks of GLP1ra including risk of pancreatitis, MTC and worsening of DR  Advised patient that they may experience improved satiety after meals and portion sizes of meals may be reduced as doses of Mounjaro increase.         Relevant Medications    tirzepatide (Mounjaro) 5 mg/0.5 mL pen injector    Other Relevant Orders    Referral to Clinical Pharmacy       Health Maintenance Due   Topic Date Due    HIV Screening  Never done    Diabetes: Retinopathy Screening  Never done    Hepatitis C Screening  Never done    Hepatitis A Vaccines (1 of 2 - Risk 2-dose series) Never done    Pneumococcal Vaccine (1 of 2 - PCV) 12/30/1982    MMR Vaccines (1 of 1 - Standard series) Never done     RSV High Risk: (Elderly (60+) or Pregnant Population) (1 - Risk 60-74 years 1-dose series) Never done    Hepatitis B Vaccines (1 of 3 - Risk 3-dose series) Never done    Diabetes: Hemoglobin A1C  06/13/2025     Labs ordered:  none  Referrals:  none  Follow-up: 4 weeks     Patient was provided with PharmD phone number and encouraged to reach out with any questions or concerns Prior to next appointment or ask provider for another pharmacy referral.    Time spent with pt: Total length of time 15 (minutes) of the encounter and more than 50% was spent counseling the patient.  Patient is NOT followed by CCM.     Thank you for allowing to take part in the care of this patient.    Erica Carcamo PharmD, IVAN  Clinical Pharmacist  [phone]    Continue all meds under the continuation of care with the referring provider and clinical pharmacy team.    Verbal consent to manage patient's drug therapy was obtained from the patient. They were informed they may decline to participate or withdraw from participation in pharmacy services at any time.

## 2025-05-09 NOTE — ASSESSMENT & PLAN NOTE
Is pt A1c at goal? No, 8.9%  Tolerating current regimen. Has started checking sugars and they have been improving over the last month. Will increase Mounjaro for additional control.    Medication Changes:  INCREASE  Mounjaro to 5mg weekly  CONTINUE  Metformin XR to 1G twice daily       PATIENT EDUCATION/GOALS  Average < 150mg/dL  Time in range > 70%  Fasting B - 130 mg/dL  Postprandial BG: less than 180 mg/dL  A1c: less than 7%    Low and High Blood Sugar  Symptoms of low blood sugar include: Fast heartbeat, shaking, sweating, nervousness or anxiety, irritability or confusion, and/or dizziness.  Symptoms of high blood sugar include: Feeling more thirsty than usual, urinating often, losing weight without trying, presence of ketones in the urine, feeling tired and weak, feeling irritable or having other mood changes, having blurry vision, and/or having slow-healing sores.  If you experience symptoms of low blood sugar (blood sugar less than 70 mg/dL) follow the rule of 15 by eating ~15 g of simple carbohydrates (examples: half cup juice, 3-4 glucose tabs, 1 tablespoon of sugar, honey, or syrup).    Dietary Recommendations  The a lower carbohydrate or Mediterranean diet is often recommended for patients with elevated blood glucose.   Food recommendations:   Focus on whole foods, with as few ingredients as possible.   Focus on lower glycemic foods (GI of 55 or less): this includes most fruits and vegetables, beans, minimally processed grains, dairy, nuts and seeds.  Minimize moderate glycemic index (GI 56 to 69) foods: White and sweet potatoes, corn, white rice, couscous, breakfast cereals such as Cream of Wheat.  Avoid high glycemic index (GI of 70 or higher): White bread, rice cakes, most crackers, bagels, cakes, doughnuts, croissants, most packaged breakfast cereals.  Include 1-2 servings weekly fatty fish that are low in mercury such as salmon, mackerel, anchovies, sardines, and herring. Avoid frying fish.  Bake, steam, or poach.   Avoid trans-fats (fried foods, microwave popcorn, margarine, etc.).   Use oils such as coconut oil, olive oil, avocado oil, or ghee. Select oils appropriate for the temperature you are cooking at.   Avoid processed meats (such as deli meat), canned soups, soy sauce, and fried foods these are all high in added sodium.   The recommended sodium intake for most people is around 2,300 mg/day (too little or too much salt can affect blood pressure).   It is ok to add salt to suit your taste to fresh whole foods (such as vegetables) that you are cooking at home.   Include 4-5 servings daily of both fruits and vegetables. Fruits and vegetables are a good source of fiber, potassium, and magnesium which help support healthy blood pressure.  Focus on eating a variety of colors each day (eating the rainbow- such as red peppers, orange carrots, yellow beans, green lettuce, blue/purple berries, white/brown onions).   Avoid foods with added sugars (goal of <10 grams/serving of added sugar). Limit added sugars to less than 24 (women)-36 (men) grams daily.  Beverage recommendations:    Avoid caffeinated drinks such as coffee, energy drinks, and soda (both regular and diet). Consider sparkling water, water with lemon (or other fruit), or black, oolong, or green tea (prior to noon).   Avoid regular consumption of alcohol. If it is a special occasion the recommended alcohol intake for a male is 2 (men) or 1(women) or less drinks per day.  Alcohol consumption may place people with diabetes at increased risk for delayed hypoglycemia (low blood sugar) especially if taking other medications that may cause hypoglycemia such as insulin.     Lifestyle Recommendations  Avoid tobacco products (including chewing tobacco and vaping).   Continue to integrate regular movement and enjoyable forms of exercise into your weekly routine. The recommended exercise regimen is 150 minutes per week (for example 5 days per week, 30  minutes per day).   Consider walking for 10-15 minutes after each meal in order to help control blood sugar.   Manage/reduce stress  Consider therapy, mindfulness, breathing exercises (4-7-8 breath), meditation, yoga, journaling, addressing/removing stressors, etc.   Sleep  Goal of 7-9 hours of restful sleep nightly.        Mounjaro Education:  Counseled patient on Mounjaro MOA, expectations, side effects, duration of therapy, administration, and monitoring parameters.  Counseled patient on the benefits of GLP-1ra, such as cardiovascular risk reduction, glycemic control, and weight loss potential.  Provided detailed dosing and administration counseling to ensure proper technique.   Reviewed Mounjaro titration schedule, starting with 2.5 mg once weekly to 5 mg, 7.5mg, 10mg, 12.5mg and if tolerated 15 mg.  Reviewed storage requirements of Mounjaro when not in use, and when to administer the medication if a dose is missed.  Discussed risks of GLP1ra including risk of pancreatitis, MTC and worsening of DR  Advised patient that they may experience improved satiety after meals and portion sizes of meals may be reduced as doses of Mounjaro increase.

## 2025-05-12 ENCOUNTER — APPOINTMENT (OUTPATIENT)
Dept: SLEEP MEDICINE | Facility: CLINIC | Age: 60
End: 2025-05-12
Payer: COMMERCIAL

## 2025-05-12 VITALS
DIASTOLIC BLOOD PRESSURE: 77 MMHG | BODY MASS INDEX: 38.51 KG/M2 | HEIGHT: 70 IN | WEIGHT: 269 LBS | SYSTOLIC BLOOD PRESSURE: 122 MMHG | OXYGEN SATURATION: 96 % | RESPIRATION RATE: 18 BRPM | HEART RATE: 71 BPM

## 2025-05-12 DIAGNOSIS — E66.9 OBESITY (BMI 30-39.9): ICD-10-CM

## 2025-05-12 DIAGNOSIS — G47.33 OSA (OBSTRUCTIVE SLEEP APNEA): Primary | ICD-10-CM

## 2025-05-12 PROCEDURE — 3078F DIAST BP <80 MM HG: CPT | Performed by: GENERAL PRACTICE

## 2025-05-12 PROCEDURE — 3008F BODY MASS INDEX DOCD: CPT | Performed by: GENERAL PRACTICE

## 2025-05-12 PROCEDURE — 99214 OFFICE O/P EST MOD 30 MIN: CPT | Performed by: GENERAL PRACTICE

## 2025-05-12 PROCEDURE — 1036F TOBACCO NON-USER: CPT | Performed by: GENERAL PRACTICE

## 2025-05-12 PROCEDURE — G8433 SCR FOR DEP NOT CPT DOC RSN: HCPCS | Performed by: GENERAL PRACTICE

## 2025-05-12 PROCEDURE — 3074F SYST BP LT 130 MM HG: CPT | Performed by: GENERAL PRACTICE

## 2025-05-12 PROCEDURE — 3052F HG A1C>EQUAL 8.0%<EQUAL 9.0%: CPT | Performed by: GENERAL PRACTICE

## 2025-05-12 ASSESSMENT — COLUMBIA-SUICIDE SEVERITY RATING SCALE - C-SSRS
6. HAVE YOU EVER DONE ANYTHING, STARTED TO DO ANYTHING, OR PREPARED TO DO ANYTHING TO END YOUR LIFE?: NO
1. IN THE PAST MONTH, HAVE YOU WISHED YOU WERE DEAD OR WISHED YOU COULD GO TO SLEEP AND NOT WAKE UP?: NO
2. HAVE YOU ACTUALLY HAD ANY THOUGHTS OF KILLING YOURSELF?: NO

## 2025-05-12 ASSESSMENT — ENCOUNTER SYMPTOMS
OCCASIONAL FEELINGS OF UNSTEADINESS: 0
DEPRESSION: 0
LOSS OF SENSATION IN FEET: 0

## 2025-05-12 ASSESSMENT — PATIENT HEALTH QUESTIONNAIRE - PHQ9
1. LITTLE INTEREST OR PLEASURE IN DOING THINGS: NOT AT ALL
SUM OF ALL RESPONSES TO PHQ9 QUESTIONS 1 AND 2: 0
2. FEELING DOWN, DEPRESSED OR HOPELESS: NOT AT ALL

## 2025-05-12 NOTE — PROGRESS NOTES
Patient: Mohan Gibbons    [id number]  : 1965 -- AGE 59 y.o.    Provider: Jeffry Lerma DO     Location [facility]   Service Date: 2025              [facility]  Follow up Visit Note        HISTORY OF PRESENT ILLNESS     HISTORY OF PRESENT ILLNESS   Mohan Gibbons is a 59 y.o. male who presents to a [facility] for a sleep medicine evaluation with concerns of yearly follow up.     The patient  has a past medical history of Endothelial corneal dystrophy, unspecified eye, Other conditions influencing health status, Other conditions influencing health status, Personal history of other diseases of the circulatory system, Personal history of other diseases of the digestive system, Personal history of other diseases of the musculoskeletal system and connective tissue, Personal history of other diseases of the nervous system and sense organs, Personal history of other endocrine, nutritional and metabolic disease (2022), and Prediabetes (2022)..    PAST SLEEP HISTORY    M with hx significant for morbid obesity (BMI 40, last CO2 23), HTN, GERD, HLD, gout, LING, T2DM, vit D deficiency, diagnosed with severe ALONA IN  (overall RDI 81, O2 javier 65%, BMI 36).     Pt reports that he has been using a VPAP RESMED since . Settings IPAP 14 cm H20, EPAP 10 cm H2O. Has a portable machine for when he is traveling.        10/21/22  patient had a sleep study done and would like to discuss his results.   patient states that he has been using his pap therapy regularly.   He did not bring his machine with him today.      23  Patient is comfortable with pressure and mask; however he has a mask leak.       5/15/23  Patient tried nasal pillows but they were not comfortable. He is using a chin strap which helped him with the leak.   He is comfortable with pressure and current mask.   He buys his supplies online.         5/12/25  Patient is tying to use pap therapy regularly, he has a travel machine for when he travels. He is comfortable with his mask and settings.   He believes his chin strap is getting old and causing some mouth air leak.      PAP Related Problems: sometimes he has a mask air leak~and dry mouth  ,~no skin irritation from mask~and~no snoring with PAP.     Perceived Benefits of PAP Therapy: refreshing sleep,~decreased nocturnal awakenings,~decreased snoring/ choking/ gasping~and~decreased morning headaches.     Sleep schedule  on weekdays / work days:  Usual Bedtime: 11pm  Sleep latency: 30min  Wake time : 7:30am   Total sleep time average/day: 7 hours/day  Awakenings: 2-3x per week, nocturia, short.   Naps: 7:30pm, 30min, every other day, dozes off while watching TV, no pap use during that time.     Sleep schedule  on weekends/non work days :  Usual Bedtime:   12am  Wake time : 8 am    Sleep aids: n  Stimulants: n    Occupation: Works as a banker semi retired.     Preferred sleeping position: SLEEP POSITION: sidelying    Sleep-related ROS:    Snoring:  n  Witnessed apneas:   n     Gasping/ choking: n    Am Dry mouth:  n           Nasal congestion:   sometimes      am headaches: n    Sleep is described as refreshing.     Drowsy driving: n  Hx of car accident: n  Near-miss Car accident: n      RLS screen:  RLSSCREEN: - Sensations: Patient does not have unusual sensations in their extremities that cause an urge to move them     Sleep-related behaviors: DENIES    ESS: 4      REVIEW OF SYSTEMS     REVIEW OF SYSTEMS  Review of Systems   All other systems reviewed and are negative.        ALLERGIES AND MEDICATIONS     ALLERGIES  No Known Allergies    MEDICATIONS  Current Outpatient Medications   Medication Sig Dispense Refill    allopurinol (Zyloprim) 100 mg tablet Take 1 tablet (100 mg) by mouth once daily. 90 tablet 3    aspirin 81 mg EC tablet Take 1 tablet (81 mg) by mouth.      atorvastatin (Lipitor) 40 mg tablet  Take 1 tablet by mouth once daily 90 tablet 3    cholecalciferol (Vitamin D-3) 50 MCG (2000 UT) tablet Take 1 tablet (50 mcg) by mouth.      ciclopirox (Penlac) 8 % solution Apply topically once daily at bedtime. , remove buildup with alcohol swab once weekly 6.6 mL 11    fluorometholone (FML) 0.1 % ophthalmic suspension Administer 1 drop into both eyes.      loratadine (Claritin) 10 mg tablet Take 1 tablet (10 mg) by mouth.      metFORMIN  mg 24 hr tablet Take 2 tablets (1,000 mg) by mouth 2 times daily (morning and late afternoon). Do not crush, chew, or split. 360 tablet 3    nebivolol (Bystolic) 10 mg tablet Take 1 tablet (10 mg) by mouth once daily. 90 tablet 3    tirzepatide (Mounjaro) 5 mg/0.5 mL pen injector Inject 5 mg under the skin 1 (one) time per week. 2 mL 2     No current facility-administered medications for this visit.         PAST HISTORY     PAST MEDICAL HISTORY  He  has a past medical history of Endothelial corneal dystrophy, unspecified eye, Other conditions influencing health status, Other conditions influencing health status, Personal history of other diseases of the circulatory system, Personal history of other diseases of the digestive system, Personal history of other diseases of the musculoskeletal system and connective tissue, Personal history of other diseases of the nervous system and sense organs, Personal history of other endocrine, nutritional and metabolic disease (04/05/2022), and Prediabetes (07/19/2022).    PAST SURGICAL HISTORY:  Past Surgical History:   Procedure Laterality Date    OTHER SURGICAL HISTORY  08/07/2020    Eye surgery    OTHER SURGICAL HISTORY  07/30/2019    Appendectomy       FAMILY HISTORY  Family History   Problem Relation Name Age of Onset    Colon cancer Mother      Hyperlipidemia Father      Hypertension Father           SOCIAL HISTORY  He  reports that he has quit smoking. His smoking use included cigarettes. He has never used smokeless tobacco. He  "reports current alcohol use. He reports that he does not use drugs.     Caffeine consumption: 20oz coffee in am, sometimes pop with lunch.    PHYSICAL EXAM     VITAL SIGNS: /77   Pulse 71   Resp 18   Ht 1.778 m (5' 10\")   Wt 122 kg (269 lb)   SpO2 96%   BMI 38.60 kg/m²      PREVIOUS WEIGHTS:  Wt Readings from Last 3 Encounters:   05/12/25 122 kg (269 lb)   04/14/25 124 kg (274 lb)   03/13/25 129 kg (285 lb 6.4 oz)       Physical Exam  Constitutional: Alert and oriented, cooperative, no obvious distress.   HENT: normocephalic.   Neurologic: AOx3.   psychiatric: appropriate mood and affect.  Integumentary: no significant rashes observed over pap mask area.       RESULTS/DATA         ASSESSMENT/PLAN     Mr. Gibbons is a 59 y.o. male and  has a past medical history of Endothelial corneal dystrophy, unspecified eye, Other conditions influencing health status, Other conditions influencing health status, Personal history of other diseases of the circulatory system, Personal history of other diseases of the digestive system, Personal history of other diseases of the musculoskeletal system and connective tissue, Personal history of other diseases of the nervous system and sense organs, Personal history of other endocrine, nutritional and metabolic disease (04/05/2022), and Prediabetes (07/19/2022). He is following up on sleep apnea.     Problem List Items Addressed This Visit    None      Problem List and Orders    M with hx significant for morbid obesity (BMI 40, last CO2 23), HTN, GERD, HLD, gout, LING, DM2, vit D deficiency, diagnosed with severe ALONA IN 2007 (overall RDI 81, O2 javier 65%, BMI 36).     1- Severe ALONA   - PSG 3/1/2007: overall RDI 81, O2 javier 65%, spent 80 min of sleep with O2<90%. BMI was 36 at that time  -HST 9/9/22 --> severe ALONA, AHI3% 73.6, AHI 4% 71.1, SpO2 javier 64.3%. consider titration      Reviewed and discussed the above sleep study results as well as download data and management " options in details. All questions answered, patient verbalizes understanding.      - cont. Bilevel 14/10cwp, rAHI 0.8, has some mask air leak, benefiting from treatment.   -His seal is much improved with a chin strap but it is getting old --> replace strap with a new one; he uses a nasal mask.   -has a travel machine which he uses when he travels.   -His DME is [facility] but sometimes buys supplies out of pocket from [facility].   -ordered supplies, order sent to [facility] in case it is needed.   -You can use a chin strap to help with your dry mouth, you can also adjust your humidity level to your comfort. You can use a nasal spray to help you breathe better from your nose.  -Please work with your DME to find a mask that fits you well, and controls your leak.     -do not drive or operate heavy machinery if drowsy.  -avoid sleeping on your back.   -avoid sedating substances/ medication, alcohol, illicit drugs and tobacco.     2- Obesity  counseled on eating a healthy diet and exercising as tolerated.   was trying to establish care with nutrition but states that he no longer wishes to do so.   He is on mounjaro    f/u 12 months or sooner as needed

## 2025-06-06 ENCOUNTER — APPOINTMENT (OUTPATIENT)
Dept: PHARMACY | Facility: HOSPITAL | Age: 60
End: 2025-06-06
Payer: COMMERCIAL

## 2025-06-06 DIAGNOSIS — I10 PRIMARY HYPERTENSION: ICD-10-CM

## 2025-06-06 DIAGNOSIS — E11.9 TYPE 2 DIABETES MELLITUS WITHOUT COMPLICATION, WITHOUT LONG-TERM CURRENT USE OF INSULIN: ICD-10-CM

## 2025-06-06 DIAGNOSIS — E66.812 CLASS 2 SEVERE OBESITY DUE TO EXCESS CALORIES WITH SERIOUS COMORBIDITY AND BODY MASS INDEX (BMI) OF 39.0 TO 39.9 IN ADULT: Primary | ICD-10-CM

## 2025-06-06 DIAGNOSIS — K75.81 NASH (NONALCOHOLIC STEATOHEPATITIS): ICD-10-CM

## 2025-06-06 DIAGNOSIS — G47.33 OSA (OBSTRUCTIVE SLEEP APNEA): ICD-10-CM

## 2025-06-06 DIAGNOSIS — E78.2 MIXED HYPERLIPIDEMIA: ICD-10-CM

## 2025-06-06 DIAGNOSIS — E66.01 CLASS 2 SEVERE OBESITY DUE TO EXCESS CALORIES WITH SERIOUS COMORBIDITY AND BODY MASS INDEX (BMI) OF 39.0 TO 39.9 IN ADULT: Primary | ICD-10-CM

## 2025-06-06 NOTE — ASSESSMENT & PLAN NOTE
Is pt A1c at goal? No, 8.9%  Tolerating current regimen. Sugars continue to improve and he continues to lose weight. Encouraged patient to schedule appt with nutrition to discuss his personal nutritional needs to minimize muscle loss while losing weight. No changes today. Will evaluate need for Mounjaro titration pending next A1c.    He did express goal of coming off metformin eventually. This can be considered once A1c is at goal.    Medication Changes:  CONTINUE  Metformin XR to 1G twice daily   Mounjaro 5mg weekly      PATIENT EDUCATION/GOALS  Average < 150mg/dL  Time in range > 70%  Fasting B - 130 mg/dL  Postprandial BG: less than 180 mg/dL  A1c: less than 7%    Low and High Blood Sugar  Symptoms of low blood sugar include: Fast heartbeat, shaking, sweating, nervousness or anxiety, irritability or confusion, and/or dizziness.  Symptoms of high blood sugar include: Feeling more thirsty than usual, urinating often, losing weight without trying, presence of ketones in the urine, feeling tired and weak, feeling irritable or having other mood changes, having blurry vision, and/or having slow-healing sores.  If you experience symptoms of low blood sugar (blood sugar less than 70 mg/dL) follow the rule of 15 by eating ~15 g of simple carbohydrates (examples: half cup juice, 3-4 glucose tabs, 1 tablespoon of sugar, honey, or syrup).    Dietary Recommendations  The a lower carbohydrate or Mediterranean diet is often recommended for patients with elevated blood glucose.   Food recommendations:   Focus on whole foods, with as few ingredients as possible.   Focus on lower glycemic foods (GI of 55 or less): this includes most fruits and vegetables, beans, minimally processed grains, dairy, nuts and seeds.  Minimize moderate glycemic index (GI 56 to 69) foods: White and sweet potatoes, corn, white rice, couscous, breakfast cereals such as Cream of Wheat.  Avoid high glycemic index (GI of 70 or higher): White bread, rice  cakes, most crackers, bagels, cakes, doughnuts, croissants, most packaged breakfast cereals.  Include 1-2 servings weekly fatty fish that are low in mercury such as salmon, mackerel, anchovies, sardines, and herring. Avoid frying fish. Bake, steam, or poach.   Avoid trans-fats (fried foods, microwave popcorn, margarine, etc.).   Use oils such as coconut oil, olive oil, avocado oil, or ghee. Select oils appropriate for the temperature you are cooking at.   Avoid processed meats (such as deli meat), canned soups, soy sauce, and fried foods these are all high in added sodium.   The recommended sodium intake for most people is around 2,300 mg/day (too little or too much salt can affect blood pressure).   It is ok to add salt to suit your taste to fresh whole foods (such as vegetables) that you are cooking at home.   Include 4-5 servings daily of both fruits and vegetables. Fruits and vegetables are a good source of fiber, potassium, and magnesium which help support healthy blood pressure.  Focus on eating a variety of colors each day (eating the rainbow- such as red peppers, orange carrots, yellow beans, green lettuce, blue/purple berries, white/brown onions).   Avoid foods with added sugars (goal of <10 grams/serving of added sugar). Limit added sugars to less than 24 (women)-36 (men) grams daily.  Beverage recommendations:    Avoid caffeinated drinks such as coffee, energy drinks, and soda (both regular and diet). Consider sparkling water, water with lemon (or other fruit), or black, oolong, or green tea (prior to noon).   Avoid regular consumption of alcohol. If it is a special occasion the recommended alcohol intake for a male is 2 (men) or 1(women) or less drinks per day.  Alcohol consumption may place people with diabetes at increased risk for delayed hypoglycemia (low blood sugar) especially if taking other medications that may cause hypoglycemia such as insulin.     Lifestyle Recommendations  Avoid tobacco  products (including chewing tobacco and vaping).   Continue to integrate regular movement and enjoyable forms of exercise into your weekly routine. The recommended exercise regimen is 150 minutes per week (for example 5 days per week, 30 minutes per day).   Consider walking for 10-15 minutes after each meal in order to help control blood sugar.   Manage/reduce stress  Consider therapy, mindfulness, breathing exercises (4-7-8 breath), meditation, yoga, journaling, addressing/removing stressors, etc.   Sleep  Goal of 7-9 hours of restful sleep nightly.        Mounjaro Education:  Counseled patient on Mounjaro MOA, expectations, side effects, duration of therapy, administration, and monitoring parameters.  Counseled patient on the benefits of GLP-1ra, such as cardiovascular risk reduction, glycemic control, and weight loss potential.  Provided detailed dosing and administration counseling to ensure proper technique.   Reviewed Mounjaro titration schedule, starting with 2.5 mg once weekly to 5 mg, 7.5mg, 10mg, 12.5mg and if tolerated 15 mg.  Reviewed storage requirements of Mounjaro when not in use, and when to administer the medication if a dose is missed.  Discussed risks of GLP1ra including risk of pancreatitis, MTC and worsening of DR  Advised patient that they may experience improved satiety after meals and portion sizes of meals may be reduced as doses of Mounjaro increase.

## 2025-06-06 NOTE — PROGRESS NOTES
Patient ID: Mohan Gibbons is a 59 y.o. male who presents for Diabetes.  Pt is here for Follow Up appointment.     Referring Provider: Jasmine Joy DO  Last Visit: 25    Verbal consent to manage patient's drug therapy was obtained from patient. They were informed they may decline to participate or withdraw from participation in pharmacy services at any time.      Subjective     Drug Interactions  None at time of review    Medication System Management (Adherence)  Patient did NOT take medications today.   Number of missed doses in last 7 days: has not started mounjaro yet  Can patient afford prescribed medications: Yes, no issues reported    Preferred pharmacy:     [facility] [address] [place], [place] - [address] [facility]  [address] [facility] [address]  Phone: [phone] Fax: [phone]    [facility][id number] - [place], [place] - [address] [facility]  [id number] [place] [address]  Phone: [phone] Fax: [phone]      [facility]  DIABETES MELLITUS TYPE 2:    Known diabetic complications: none.  Does patient follow with Endocrinology?: No     Last optometry exam: not assessed  Most recent visit in Podiatry: not assessed     Current diabetic medications include:  Mounjaro 5mg weekly 3 doses  Metformin XR 1G twice daily     Adverse Effects: none reported     Home blood glucose records (mg/dL)  FB-130s, more recently <110  Previous -185 in March, more recently has been 125-130 or below; 114 lowest today    Any episodes of hypoglycemia? No, none reported.    Did patient treat episode of hypoglycemia appropriately? N/A  Does the patient have a prescription for ready-to-use Glucagon? 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 Plans to use the new free time and nice weather to do yard work, housework, etc.   Illicit substances/Alcohol: craft beer on the weekends, feeling like he takes much longer to drink now    Secondary Prevention  Aspirin? Yes  Statin? Yes  ACE-I/ARB? No  UACR/EGFR in last year?: Yes  ALBUMIN/CREATININE RATIO, RANDOM URINE   Date Value Ref Range Status   03/13/2025 5 <30 mg/g creat Final     Comment:        The ADA defines abnormalities in albumin  excretion as follows:     Albuminuria Category        Result (mg/g creatinine)     Normal to Mildly increased   <30  Moderately increased            Severely increased           > OR = 300     The ADA recommends that at least two of three  specimens collected within a 3-6 month period be  abnormal before considering a patient to be  within a diagnostic category.       Albumin/Creatine Ratio   Date Value Ref Range Status   07/07/2023 SEE COMMENT 0.0 - 30.0 ug/mg crt Final     Comment:     One or more analytes used in this calculation   is outside of the analytical measurement range.  Calculation cannot be performed.       Pertinent PMH Review:  PMH of Pancreatitis: No  PMH of Retinopathy: No  PMH of Urinary Tract Infections: No  PMH of MTC: No  PMH of MEN2: No    Review of Systems    Objective     BP Readings from Last 4 Encounters:   05/12/25 122/77   04/14/25 118/74   03/13/25 130/86   06/26/24 120/78      There were no vitals filed for this visit.     Labs  CREATININE   Date Value Ref Range Status   03/13/2025 0.78 0.70 - 1.35 mg/dL Final     EGFR   Date Value Ref Range Status   03/13/2025 101 > OR = 60 mL/min/1.73m2 Final     SODIUM   Date Value Ref Range Status   03/13/2025 140 135 - 146 mmol/L Final     POTASSIUM   Date Value Ref Range Status   03/13/2025 4.6 3.5 - 5.3 mmol/L Final     CHLORIDE   Date Value Ref Range Status   03/13/2025 105 98 - 110 mmol/L Final     UREA NITROGEN (BUN)    Date Value Ref Range Status   03/13/2025 16 7 - 25 mg/dL Final     AST   Date Value Ref Range Status   03/13/2025 32 10 - 35 U/L Final        Lab Results   Component Value Date    BILITOT 0.7 03/13/2025    CALCIUM 9.8 03/13/2025    CO2 24 03/13/2025     03/13/2025    CREATININE 0.78 03/13/2025    GLUCOSE 257 (H) 03/13/2025    ALKPHOS 77 03/13/2025    K 4.6 03/13/2025    PROT 7.2 03/13/2025     03/13/2025    AST 32 03/13/2025    ALT 46 03/13/2025    BUN 16 03/13/2025    ANIONGAP 11 03/13/2025    ALBUMIN 4.3 03/13/2025    GFRMALE >90 07/07/2023     Lab Results   Component Value Date    TRIG 240 (H) 03/13/2025    CHOL 128 03/13/2025    LDLCALC 62 03/13/2025    HDL 36 (L) 03/13/2025     Lab Results   Component Value Date    HGBA1C 8.9 (A) 03/13/2025     ALBUMIN/CREATININE RATIO, RANDOM URINE   Date Value Ref Range Status   03/13/2025 5 <30 mg/g creat Final     Comment:        The ADA defines abnormalities in albumin  excretion as follows:     Albuminuria Category        Result (mg/g creatinine)     Normal to Mildly increased   <30  Moderately increased            Severely increased           > OR = 300     The ADA recommends that at least two of three  specimens collected within a 3-6 month period be  abnormal before considering a patient to be  within a diagnostic category.       Albumin/Creatine Ratio   Date Value Ref Range Status   07/07/2023 SEE COMMENT 0.0 - 30.0 ug/mg crt Final     Comment:     One or more analytes used in this calculation   is outside of the analytical measurement range.  Calculation cannot be performed.       Current Outpatient Medications   Medication Instructions    allopurinol (ZYLOPRIM) 100 mg, oral, Daily    aspirin 81 mg    atorvastatin (LIPITOR) 40 mg, oral, Daily    cholecalciferol (VITAMIN D-3) 50 mcg    ciclopirox (Penlac) 8 % solution Topical, Nightly, , remove buildup with alcohol swab once weekly    fluorometholone (FML) 0.1 % ophthalmic suspension 1 drop     loratadine (CLARITIN) 10 mg    metFORMIN XR (GLUCOPHAGE-XR) 1,000 mg, oral, 2 times daily (morning and late afternoon), Do not crush, chew, or split.    Mounjaro 5 mg, subcutaneous, Weekly    nebivolol (BYSTOLIC) 10 mg, oral, Daily        Assessment/Plan   Problem List Items Addressed This Visit           ICD-10-CM    Type 2 diabetes mellitus without complication, without long-term current use of insulin E11.9    Is pt A1c at goal? No, 8.9%  Tolerating current regimen. Sugars continue to improve and he continues to lose weight. Encouraged patient to schedule appt with nutrition to discuss his personal nutritional needs to minimize muscle loss while losing weight. No changes today. Will evaluate need for Mounjaro titration pending next A1c.    He did express goal of coming off metformin eventually. This can be considered once A1c is at goal.    Medication Changes:  CONTINUE  Metformin XR to 1G twice daily   Mounjaro 5mg weekly      PATIENT EDUCATION/GOALS  Average < 150mg/dL  Time in range > 70%  Fasting B - 130 mg/dL  Postprandial BG: less than 180 mg/dL  A1c: less than 7%    Low and High Blood Sugar  Symptoms of low blood sugar include: Fast heartbeat, shaking, sweating, nervousness or anxiety, irritability or confusion, and/or dizziness.  Symptoms of high blood sugar include: Feeling more thirsty than usual, urinating often, losing weight without trying, presence of ketones in the urine, feeling tired and weak, feeling irritable or having other mood changes, having blurry vision, and/or having slow-healing sores.  If you experience symptoms of low blood sugar (blood sugar less than 70 mg/dL) follow the rule of 15 by eating ~15 g of simple carbohydrates (examples: half cup juice, 3-4 glucose tabs, 1 tablespoon of sugar, honey, or syrup).    Dietary Recommendations  The a lower carbohydrate or Mediterranean diet is often recommended for patients with elevated blood glucose.   Food recommendations:   Focus on  whole foods, with as few ingredients as possible.   Focus on lower glycemic foods (GI of 55 or less): this includes most fruits and vegetables, beans, minimally processed grains, dairy, nuts and seeds.  Minimize moderate glycemic index (GI 56 to 69) foods: White and sweet potatoes, corn, white rice, couscous, breakfast cereals such as Cream of Wheat.  Avoid high glycemic index (GI of 70 or higher): White bread, rice cakes, most crackers, bagels, cakes, doughnuts, croissants, most packaged breakfast cereals.  Include 1-2 servings weekly fatty fish that are low in mercury such as salmon, mackerel, anchovies, sardines, and herring. Avoid frying fish. Bake, steam, or poach.   Avoid trans-fats (fried foods, microwave popcorn, margarine, etc.).   Use oils such as coconut oil, olive oil, avocado oil, or ghee. Select oils appropriate for the temperature you are cooking at.   Avoid processed meats (such as deli meat), canned soups, soy sauce, and fried foods these are all high in added sodium.   The recommended sodium intake for most people is around 2,300 mg/day (too little or too much salt can affect blood pressure).   It is ok to add salt to suit your taste to fresh whole foods (such as vegetables) that you are cooking at home.   Include 4-5 servings daily of both fruits and vegetables. Fruits and vegetables are a good source of fiber, potassium, and magnesium which help support healthy blood pressure.  Focus on eating a variety of colors each day (eating the rainbow- such as red peppers, orange carrots, yellow beans, green lettuce, blue/purple berries, white/brown onions).   Avoid foods with added sugars (goal of <10 grams/serving of added sugar). Limit added sugars to less than 24 (women)-36 (men) grams daily.  Beverage recommendations:    Avoid caffeinated drinks such as coffee, energy drinks, and soda (both regular and diet). Consider sparkling water, water with lemon (or other fruit), or black, oolong, or green tea  (prior to noon).   Avoid regular consumption of alcohol. If it is a special occasion the recommended alcohol intake for a male is 2 (men) or 1(women) or less drinks per day.  Alcohol consumption may place people with diabetes at increased risk for delayed hypoglycemia (low blood sugar) especially if taking other medications that may cause hypoglycemia such as insulin.     Lifestyle Recommendations  Avoid tobacco products (including chewing tobacco and vaping).   Continue to integrate regular movement and enjoyable forms of exercise into your weekly routine. The recommended exercise regimen is 150 minutes per week (for example 5 days per week, 30 minutes per day).   Consider walking for 10-15 minutes after each meal in order to help control blood sugar.   Manage/reduce stress  Consider therapy, mindfulness, breathing exercises (4-7-8 breath), meditation, yoga, journaling, addressing/removing stressors, etc.   Sleep  Goal of 7-9 hours of restful sleep nightly.        Mounjaro Education:  Counseled patient on Mounjaro MOA, expectations, side effects, duration of therapy, administration, and monitoring parameters.  Counseled patient on the benefits of GLP-1ra, such as cardiovascular risk reduction, glycemic control, and weight loss potential.  Provided detailed dosing and administration counseling to ensure proper technique.   Reviewed Mounjaro titration schedule, starting with 2.5 mg once weekly to 5 mg, 7.5mg, 10mg, 12.5mg and if tolerated 15 mg.  Reviewed storage requirements of Mounjaro when not in use, and when to administer the medication if a dose is missed.  Discussed risks of GLP1ra including risk of pancreatitis, MTC and worsening of DR  Advised patient that they may experience improved satiety after meals and portion sizes of meals may be reduced as doses of Mounjaro increase.         Relevant Orders    Referral to Clinical Pharmacy         Health Maintenance Due   Topic Date Due    HIV Screening  Never done     Diabetes: Retinopathy Screening  Never done    Hepatitis C Screening  Never done    Hepatitis A Vaccines (1 of 2 - Risk 2-dose series) Never done    Pneumococcal Vaccine (1 of 2 - PCV) 12/30/1982    MMR Vaccines (1 of 1 - Standard series) Never done    RSV High Risk: (Elderly (60+) or Pregnant Population) (1 - Risk 60-74 years 1-dose series) Never done    Hepatitis B Vaccines (1 of 3 - Risk 3-dose series) Never done    Diabetes: Hemoglobin A1C  06/13/2025     Labs ordered:  none  Referrals:  none  Follow-up: 4 weeks     Patient was provided with PharmD phone number and encouraged to reach out with any questions or concerns Prior to next appointment or ask provider for another pharmacy referral.    Time spent with pt: Total length of time 16 (minutes) of the encounter and more than 50% was spent counseling the patient.  Patient is NOT followed by CCM.     Thank you for allowing to take part in the care of this patient.    Erica Carcamo, PharmD, IVAN  Clinical Pharmacist  [phone]    Continue all meds under the continuation of care with the referring provider and clinical pharmacy team.    Verbal consent to manage patient's drug therapy was obtained from the patient. They were informed they may decline to participate or withdraw from participation in pharmacy services at any time.

## 2025-06-14 DIAGNOSIS — E11.9 TYPE 2 DIABETES MELLITUS WITHOUT COMPLICATION, WITHOUT LONG-TERM CURRENT USE OF INSULIN: ICD-10-CM

## 2025-06-19 DIAGNOSIS — I10 BENIGN HYPERTENSION: ICD-10-CM

## 2025-06-20 RX ORDER — NEBIVOLOL 10 MG/1
10 TABLET ORAL DAILY
Qty: 90 TABLET | Refills: 3 | Status: SHIPPED | OUTPATIENT
Start: 2025-06-20

## 2025-06-21 LAB
EST. AVERAGE GLUCOSE BLD GHB EST-MCNC: 126 MG/DL
EST. AVERAGE GLUCOSE BLD GHB EST-SCNC: 7 MMOL/L
HBA1C MFR BLD: 6 %

## 2025-06-30 ENCOUNTER — APPOINTMENT (OUTPATIENT)
Dept: PHARMACY | Facility: HOSPITAL | Age: 60
End: 2025-06-30
Payer: COMMERCIAL

## 2025-06-30 DIAGNOSIS — E11.9 TYPE 2 DIABETES MELLITUS WITHOUT COMPLICATION, WITHOUT LONG-TERM CURRENT USE OF INSULIN: ICD-10-CM

## 2025-06-30 NOTE — PROGRESS NOTES
Patient ID: Mohan Gibbons is a 59 y.o. male who presents for Diabetes.  Pt is here for Follow Up appointment.     Referring Provider: Jasmine Joy DO  Last Visit: 25    Verbal consent to manage patient's drug therapy was obtained from patient. They were informed they may decline to participate or withdraw from participation in pharmacy services at any time.      Subjective     Drug Interactions  None at time of review    Medication System Management (Adherence)  Patient did take medications today.   Number of missed doses in last 7 days: 0  Can patient afford prescribed medications: Yes, no issues reported    Preferred pharmacy:     [facility] [address] [place], [place] - [address] [facility]  [address] [facility] [address]  Phone: [phone] Fax: [phone]    [id number] - [place], [place] - [address] [facility] [address] [facility]  [address] [facility] [address]  Phone: [phone] Fax: [phone]      \[facility]\""  DIABETES MELLITUS TYPE 2:    Known diabetic complications: none.  Does patient follow with Endocrinology?: No     Last optometry exam: not assessed  Most recent visit in Podiatry: not assessed     Current diabetic medications include:  Mounjaro 5mg weekly   Metformin XR 1G twice daily     Adverse Effects: none reported     Home blood glucose records (mg/dL)  FBs, lowest 103  Previous -130s, more recently <110    Any episodes of hypoglycemia? No, none reported.    Did patient treat episode of hypoglycemia appropriately? N/A  Does the patient have a prescription for ready-to-use Glucagon? 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 Plans to use the new free  time and nice weather to do yard work, housework, etc.   Illicit substances/Alcohol: craft beer on the weekends, feeling like he takes much longer to drink now    Secondary Prevention  Aspirin? Yes  Statin? Yes  ACE-I/ARB? No  UACR/EGFR in last year?: Yes  ALBUMIN/CREATININE RATIO, RANDOM URINE   Date Value Ref Range Status   03/13/2025 5 <30 mg/g creat Final     Comment:        The ADA defines abnormalities in albumin  excretion as follows:     Albuminuria Category        Result (mg/g creatinine)     Normal to Mildly increased   <30  Moderately increased            Severely increased           > OR = 300     The ADA recommends that at least two of three  specimens collected within a 3-6 month period be  abnormal before considering a patient to be  within a diagnostic category.       Albumin/Creatine Ratio   Date Value Ref Range Status   07/07/2023 SEE COMMENT 0.0 - 30.0 ug/mg crt Final     Comment:     One or more analytes used in this calculation   is outside of the analytical measurement range.  Calculation cannot be performed.       Pertinent PMH Review:  PMH of Pancreatitis: No  PMH of Retinopathy: No  PMH of Urinary Tract Infections: No  PMH of MTC: No  PMH of MEN2: No    Review of Systems    Objective     BP Readings from Last 4 Encounters:   05/12/25 122/77   04/14/25 118/74   03/13/25 130/86   06/26/24 120/78      There were no vitals filed for this visit.     Labs  CREATININE   Date Value Ref Range Status   03/13/2025 0.78 0.70 - 1.35 mg/dL Final     EGFR   Date Value Ref Range Status   03/13/2025 101 > OR = 60 mL/min/1.73m2 Final     SODIUM   Date Value Ref Range Status   03/13/2025 140 135 - 146 mmol/L Final     POTASSIUM   Date Value Ref Range Status   03/13/2025 4.6 3.5 - 5.3 mmol/L Final     CHLORIDE   Date Value Ref Range Status   03/13/2025 105 98 - 110 mmol/L Final     UREA NITROGEN (BUN)   Date Value Ref Range Status   03/13/2025 16 7 - 25 mg/dL Final     AST   Date Value Ref Range Status    03/13/2025 32 10 - 35 U/L Final        Lab Results   Component Value Date    BILITOT 0.7 03/13/2025    CALCIUM 9.8 03/13/2025    CO2 24 03/13/2025     03/13/2025    CREATININE 0.78 03/13/2025    GLUCOSE 257 (H) 03/13/2025    ALKPHOS 77 03/13/2025    K 4.6 03/13/2025    PROT 7.2 03/13/2025     03/13/2025    AST 32 03/13/2025    ALT 46 03/13/2025    BUN 16 03/13/2025    ANIONGAP 11 03/13/2025    ALBUMIN 4.3 03/13/2025    GFRMALE >90 07/07/2023     Lab Results   Component Value Date    TRIG 240 (H) 03/13/2025    CHOL 128 03/13/2025    LDLCALC 62 03/13/2025    HDL 36 (L) 03/13/2025     Lab Results   Component Value Date    HGBA1C 6.0 (H) 06/20/2025     ALBUMIN/CREATININE RATIO, RANDOM URINE   Date Value Ref Range Status   03/13/2025 5 <30 mg/g creat Final     Comment:        The ADA defines abnormalities in albumin  excretion as follows:     Albuminuria Category        Result (mg/g creatinine)     Normal to Mildly increased   <30  Moderately increased            Severely increased           > OR = 300     The ADA recommends that at least two of three  specimens collected within a 3-6 month period be  abnormal before considering a patient to be  within a diagnostic category.       Albumin/Creatine Ratio   Date Value Ref Range Status   07/07/2023 SEE COMMENT 0.0 - 30.0 ug/mg crt Final     Comment:     One or more analytes used in this calculation   is outside of the analytical measurement range.  Calculation cannot be performed.       Current Outpatient Medications   Medication Instructions    allopurinol (ZYLOPRIM) 100 mg, oral, Daily    aspirin 81 mg    atorvastatin (LIPITOR) 40 mg, oral, Daily    cholecalciferol (VITAMIN D-3) 50 mcg    ciclopirox (Penlac) 8 % solution Topical, Nightly, , remove buildup with alcohol swab once weekly    fluorometholone (FML) 0.1 % ophthalmic suspension 1 drop    loratadine (CLARITIN) 10 mg    metFORMIN XR (GLUCOPHAGE-XR) 1,000 mg, oral, 2 times daily (morning and late  afternoon), Do not crush, chew, or split.    Mounjaro 5 mg, subcutaneous, Weekly    nebivolol (BYSTOLIC) 10 mg, oral, Daily        Assessment/Plan   Problem List Items Addressed This Visit           ICD-10-CM    Type 2 diabetes mellitus without complication, without long-term current use of insulin E11.9    Is pt A1c at goal? Yes, 6%  Tolerating current regimen. A1c much improved. Will trial off metformin to simplify regimen. PCP to assess need for increase of Mounjaro at appt in 2 weeks.    Medication Changes:  DECREASING  Metformin XR to 500mg twice daily  CONTINUE  Mounjaro 5mg weekly      PATIENT EDUCATION/GOALS  Average < 150mg/dL  Time in range > 70%  Fasting B - 130 mg/dL  Postprandial BG: less than 180 mg/dL  A1c: less than 7%    Low and High Blood Sugar  Symptoms of low blood sugar include: Fast heartbeat, shaking, sweating, nervousness or anxiety, irritability or confusion, and/or dizziness.  Symptoms of high blood sugar include: Feeling more thirsty than usual, urinating often, losing weight without trying, presence of ketones in the urine, feeling tired and weak, feeling irritable or having other mood changes, having blurry vision, and/or having slow-healing sores.  If you experience symptoms of low blood sugar (blood sugar less than 70 mg/dL) follow the rule of 15 by eating ~15 g of simple carbohydrates (examples: half cup juice, 3-4 glucose tabs, 1 tablespoon of sugar, honey, or syrup).    Dietary Recommendations  The a lower carbohydrate or Mediterranean diet is often recommended for patients with elevated blood glucose.   Food recommendations:   Focus on whole foods, with as few ingredients as possible.   Focus on lower glycemic foods (GI of 55 or less): this includes most fruits and vegetables, beans, minimally processed grains, dairy, nuts and seeds.  Minimize moderate glycemic index (GI 56 to 69) foods: White and sweet potatoes, corn, white rice, couscous, breakfast cereals such as Cream of  Wheat.  Avoid high glycemic index (GI of 70 or higher): White bread, rice cakes, most crackers, bagels, cakes, doughnuts, croissants, most packaged breakfast cereals.  Include 1-2 servings weekly fatty fish that are low in mercury such as salmon, mackerel, anchovies, sardines, and herring. Avoid frying fish. Bake, steam, or poach.   Avoid trans-fats (fried foods, microwave popcorn, margarine, etc.).   Use oils such as coconut oil, olive oil, avocado oil, or ghee. Select oils appropriate for the temperature you are cooking at.   Avoid processed meats (such as deli meat), canned soups, soy sauce, and fried foods these are all high in added sodium.   The recommended sodium intake for most people is around 2,300 mg/day (too little or too much salt can affect blood pressure).   It is ok to add salt to suit your taste to fresh whole foods (such as vegetables) that you are cooking at home.   Include 4-5 servings daily of both fruits and vegetables. Fruits and vegetables are a good source of fiber, potassium, and magnesium which help support healthy blood pressure.  Focus on eating a variety of colors each day (eating the rainbow- such as red peppers, orange carrots, yellow beans, green lettuce, blue/purple berries, white/brown onions).   Avoid foods with added sugars (goal of <10 grams/serving of added sugar). Limit added sugars to less than 24 (women)-36 (men) grams daily.  Beverage recommendations:    Avoid caffeinated drinks such as coffee, energy drinks, and soda (both regular and diet). Consider sparkling water, water with lemon (or other fruit), or black, oolong, or green tea (prior to noon).   Avoid regular consumption of alcohol. If it is a special occasion the recommended alcohol intake for a male is 2 (men) or 1(women) or less drinks per day.  Alcohol consumption may place people with diabetes at increased risk for delayed hypoglycemia (low blood sugar) especially if taking other medications that may cause  hypoglycemia such as insulin.     Lifestyle Recommendations  Avoid tobacco products (including chewing tobacco and vaping).   Continue to integrate regular movement and enjoyable forms of exercise into your weekly routine. The recommended exercise regimen is 150 minutes per week (for example 5 days per week, 30 minutes per day).   Consider walking for 10-15 minutes after each meal in order to help control blood sugar.   Manage/reduce stress  Consider therapy, mindfulness, breathing exercises (4-7-8 breath), meditation, yoga, journaling, addressing/removing stressors, etc.   Sleep  Goal of 7-9 hours of restful sleep nightly.        Mounjaro Education:  Counseled patient on Mounjaro MOA, expectations, side effects, duration of therapy, administration, and monitoring parameters.  Counseled patient on the benefits of GLP-1ra, such as cardiovascular risk reduction, glycemic control, and weight loss potential.  Provided detailed dosing and administration counseling to ensure proper technique.   Reviewed Mounjaro titration schedule, starting with 2.5 mg once weekly to 5 mg, 7.5mg, 10mg, 12.5mg and if tolerated 15 mg.  Reviewed storage requirements of Mounjaro when not in use, and when to administer the medication if a dose is missed.  Discussed risks of GLP1ra including risk of pancreatitis, MTC and worsening of DR  Advised patient that they may experience improved satiety after meals and portion sizes of meals may be reduced as doses of Mounjaro increase.         Relevant Orders    Referral to Clinical Pharmacy           Health Maintenance Due   Topic Date Due    HIV Screening  Never done    Diabetes: Retinopathy Screening  Never done    Hepatitis C Screening  Never done    Hepatitis A Vaccines (1 of 2 - Risk 2-dose series) Never done    Pneumococcal Vaccine (1 of 2 - PCV) 12/30/1982    MMR Vaccines (1 of 1 - Standard series) Never done    RSV High Risk: (Elderly (60+) or Pregnant Population) (1 - Risk 60-74 years 1-dose  series) Never done    Hepatitis B Vaccines (1 of 3 - Risk 3-dose series) Never done     Labs ordered:  none  Referrals:  none  Follow-up: 5 weeks     Patient was provided with PharmD phone number and encouraged to reach out with any questions or concerns Prior to next appointment or ask provider for another pharmacy referral.    Time spent with pt: Total length of time 12 (minutes) of the encounter and more than 50% was spent counseling the patient.  Patient is NOT followed by CCM.     Thank you for allowing to take part in the care of this patient.    Erica Carcamo PharmD, IVAN  Clinical Pharmacist  [phone]    Continue all meds under the continuation of care with the referring provider and clinical pharmacy team.    Verbal consent to manage patient's drug therapy was obtained from the patient. They were informed they may decline to participate or withdraw from participation in pharmacy services at any time.

## 2025-06-30 NOTE — ASSESSMENT & PLAN NOTE
Is pt A1c at goal? Yes, 6%  Tolerating current regimen. A1c much improved. Will trial off metformin to simplify regimen. PCP to assess need for increase of Mounjaro at appt in 2 weeks.    Medication Changes:  DECREASING  Metformin XR to 500mg twice daily  CONTINUE  Mounjaro 5mg weekly      PATIENT EDUCATION/GOALS  Average < 150mg/dL  Time in range > 70%  Fasting B - 130 mg/dL  Postprandial BG: less than 180 mg/dL  A1c: less than 7%    Low and High Blood Sugar  Symptoms of low blood sugar include: Fast heartbeat, shaking, sweating, nervousness or anxiety, irritability or confusion, and/or dizziness.  Symptoms of high blood sugar include: Feeling more thirsty than usual, urinating often, losing weight without trying, presence of ketones in the urine, feeling tired and weak, feeling irritable or having other mood changes, having blurry vision, and/or having slow-healing sores.  If you experience symptoms of low blood sugar (blood sugar less than 70 mg/dL) follow the rule of 15 by eating ~15 g of simple carbohydrates (examples: half cup juice, 3-4 glucose tabs, 1 tablespoon of sugar, honey, or syrup).    Dietary Recommendations  The a lower carbohydrate or Mediterranean diet is often recommended for patients with elevated blood glucose.   Food recommendations:   Focus on whole foods, with as few ingredients as possible.   Focus on lower glycemic foods (GI of 55 or less): this includes most fruits and vegetables, beans, minimally processed grains, dairy, nuts and seeds.  Minimize moderate glycemic index (GI 56 to 69) foods: White and sweet potatoes, corn, white rice, couscous, breakfast cereals such as Cream of Wheat.  Avoid high glycemic index (GI of 70 or higher): White bread, rice cakes, most crackers, bagels, cakes, doughnuts, croissants, most packaged breakfast cereals.  Include 1-2 servings weekly fatty fish that are low in mercury such as salmon, mackerel, anchovies, sardines, and herring. Avoid frying fish.  Bake, steam, or poach.   Avoid trans-fats (fried foods, microwave popcorn, margarine, etc.).   Use oils such as coconut oil, olive oil, avocado oil, or ghee. Select oils appropriate for the temperature you are cooking at.   Avoid processed meats (such as deli meat), canned soups, soy sauce, and fried foods these are all high in added sodium.   The recommended sodium intake for most people is around 2,300 mg/day (too little or too much salt can affect blood pressure).   It is ok to add salt to suit your taste to fresh whole foods (such as vegetables) that you are cooking at home.   Include 4-5 servings daily of both fruits and vegetables. Fruits and vegetables are a good source of fiber, potassium, and magnesium which help support healthy blood pressure.  Focus on eating a variety of colors each day (eating the rainbow- such as red peppers, orange carrots, yellow beans, green lettuce, blue/purple berries, white/brown onions).   Avoid foods with added sugars (goal of <10 grams/serving of added sugar). Limit added sugars to less than 24 (women)-36 (men) grams daily.  Beverage recommendations:    Avoid caffeinated drinks such as coffee, energy drinks, and soda (both regular and diet). Consider sparkling water, water with lemon (or other fruit), or black, oolong, or green tea (prior to noon).   Avoid regular consumption of alcohol. If it is a special occasion the recommended alcohol intake for a male is 2 (men) or 1(women) or less drinks per day.  Alcohol consumption may place people with diabetes at increased risk for delayed hypoglycemia (low blood sugar) especially if taking other medications that may cause hypoglycemia such as insulin.     Lifestyle Recommendations  Avoid tobacco products (including chewing tobacco and vaping).   Continue to integrate regular movement and enjoyable forms of exercise into your weekly routine. The recommended exercise regimen is 150 minutes per week (for example 5 days per week, 30  minutes per day).   Consider walking for 10-15 minutes after each meal in order to help control blood sugar.   Manage/reduce stress  Consider therapy, mindfulness, breathing exercises (4-7-8 breath), meditation, yoga, journaling, addressing/removing stressors, etc.   Sleep  Goal of 7-9 hours of restful sleep nightly.        Mounjaro Education:  Counseled patient on Mounjaro MOA, expectations, side effects, duration of therapy, administration, and monitoring parameters.  Counseled patient on the benefits of GLP-1ra, such as cardiovascular risk reduction, glycemic control, and weight loss potential.  Provided detailed dosing and administration counseling to ensure proper technique.   Reviewed Mounjaro titration schedule, starting with 2.5 mg once weekly to 5 mg, 7.5mg, 10mg, 12.5mg and if tolerated 15 mg.  Reviewed storage requirements of Mounjaro when not in use, and when to administer the medication if a dose is missed.  Discussed risks of GLP1ra including risk of pancreatitis, MTC and worsening of DR  Advised patient that they may experience improved satiety after meals and portion sizes of meals may be reduced as doses of Mounjaro increase.

## 2025-07-02 ENCOUNTER — APPOINTMENT (OUTPATIENT)
Dept: PRIMARY CARE | Facility: CLINIC | Age: 60
End: 2025-07-02
Payer: COMMERCIAL

## 2025-07-15 ENCOUNTER — APPOINTMENT (OUTPATIENT)
Dept: PRIMARY CARE | Facility: CLINIC | Age: 60
End: 2025-07-15
Payer: COMMERCIAL

## 2025-07-15 VITALS
BODY MASS INDEX: 35.02 KG/M2 | HEIGHT: 70 IN | OXYGEN SATURATION: 98 % | SYSTOLIC BLOOD PRESSURE: 126 MMHG | DIASTOLIC BLOOD PRESSURE: 60 MMHG | RESPIRATION RATE: 18 BRPM | HEART RATE: 67 BPM | TEMPERATURE: 96.2 F | WEIGHT: 244.6 LBS

## 2025-07-15 DIAGNOSIS — B35.1 ONYCHOMYCOSIS: ICD-10-CM

## 2025-07-15 DIAGNOSIS — I10 PRIMARY HYPERTENSION: ICD-10-CM

## 2025-07-15 DIAGNOSIS — E11.9 TYPE 2 DIABETES MELLITUS WITHOUT COMPLICATION, WITHOUT LONG-TERM CURRENT USE OF INSULIN: Primary | ICD-10-CM

## 2025-07-15 DIAGNOSIS — E66.812 CLASS 2 SEVERE OBESITY DUE TO EXCESS CALORIES WITH SERIOUS COMORBIDITY AND BODY MASS INDEX (BMI) OF 35.0 TO 35.9 IN ADULT: ICD-10-CM

## 2025-07-15 DIAGNOSIS — M10.9 GOUT, UNSPECIFIED CAUSE, UNSPECIFIED CHRONICITY, UNSPECIFIED SITE: ICD-10-CM

## 2025-07-15 DIAGNOSIS — E78.2 MIXED HYPERLIPIDEMIA: ICD-10-CM

## 2025-07-15 DIAGNOSIS — E66.01 CLASS 2 SEVERE OBESITY DUE TO EXCESS CALORIES WITH SERIOUS COMORBIDITY AND BODY MASS INDEX (BMI) OF 35.0 TO 35.9 IN ADULT: ICD-10-CM

## 2025-07-15 PROBLEM — K75.81 NASH (NONALCOHOLIC STEATOHEPATITIS): Status: RESOLVED | Noted: 2023-03-14 | Resolved: 2025-07-15

## 2025-07-15 PROCEDURE — 99214 OFFICE O/P EST MOD 30 MIN: CPT

## 2025-07-15 PROCEDURE — 3052F HG A1C>EQUAL 8.0%<EQUAL 9.0%: CPT

## 2025-07-15 PROCEDURE — 3074F SYST BP LT 130 MM HG: CPT

## 2025-07-15 PROCEDURE — 3008F BODY MASS INDEX DOCD: CPT

## 2025-07-15 PROCEDURE — 1036F TOBACCO NON-USER: CPT

## 2025-07-15 PROCEDURE — 3078F DIAST BP <80 MM HG: CPT

## 2025-07-15 RX ORDER — TIRZEPATIDE 7.5 MG/.5ML
7.5 INJECTION, SOLUTION SUBCUTANEOUS WEEKLY
Qty: 2 ML | Refills: 11 | Status: SHIPPED | OUTPATIENT
Start: 2025-07-15

## 2025-07-15 RX ORDER — ALLOPURINOL 100 MG/1
100 TABLET ORAL DAILY
Qty: 90 TABLET | Refills: 3 | Status: SHIPPED | OUTPATIENT
Start: 2025-07-15

## 2025-07-15 ASSESSMENT — PATIENT HEALTH QUESTIONNAIRE - PHQ9
SUM OF ALL RESPONSES TO PHQ9 QUESTIONS 1 AND 2: 0
2. FEELING DOWN, DEPRESSED OR HOPELESS: NOT AT ALL
1. LITTLE INTEREST OR PLEASURE IN DOING THINGS: NOT AT ALL

## 2025-07-15 NOTE — PROGRESS NOTES
"Subjective   Mohan Gibbons is a 59 y.o. male     3-month diabetes follow up  A1c: 6.0% on 6/20/25. Highest A1c was 8.9% on 3/13/25  Annual urine microalbumin & serum creatinine: UTD from 3/13/25, normal  Current DM meds: metformin xr 500mg BID, mounjaro 5mg weekly  Prescribed an ACE-I/ARB/ARNI: No (on nebivolol for HTN)  Prescribed a statin: Yes (atorvastatin)   Monitors glucose at home: Yes. 115 on average now  Low carb diet: Not really, going to schedule with a nutritionist soon  Exercise: Infrequently, mostly just does yard work, but he does feel like he is losing muscle mass  Flu Vaccine given: Yes (1/5/2025)  Prevnar Vaccine given: Yes (1/5/2025)  Annual diabetic eye exam by optometry/ophthalmology: UTD from 4/2025  Annual diabetic foot exam by PCP or podiatry: Needs today    He has lost 40 pounds in the last 4 months on Mounjaro    He has not been very consistent with the Penlac solution for his toenail fungus so they still look pretty bad    Objective   /60   Pulse 67   Temp 35.7 °C (96.2 °F)   Resp 18   Ht 1.778 m (5' 10\")   Wt 111 kg (244 lb 9.6 oz)   SpO2 98%   BMI 35.10 kg/m²    PHYSICAL EXAM  Gen: Well appearing, in NAD  Eyes: EOMI  HEENT: MMM  Heart: RRR, no murmurs  Lungs: No increased work of breathing, CTAB, on RA  GI: Soft, NTND, no guarding or rebound  Extremities: WWP, cap refill <2sec, no pitting edema in LE b/l  Neuro: Alert, symmetrical facies, moves all extremities equally  Skin:     Right foot:   Protective Sensation: 4 sites tested. 4 sites sensed.   Skin integrity: Normal.      Left foot:   Protective Sensation: 4 sites tested. 4 sites sensed.       Skin integrity: Normal.    Psych: Appropriate mood and affect    Assessment/Plan     Problem List Items Addressed This Visit       Primary hypertension    Overview   Well-controlled on nebivolol 10 mg daily         Class 2 severe obesity due to excess calories with serious comorbidity and body mass index (BMI) of 35.0 to 35.9 in " adult    Overview   Counseled on healthy diet and regular exercise.  On Mounjaro for type 2 diabetes and obesity         Current Assessment & Plan   Will increase Mounjaro from 5 to 7.5 mg weekly for added weight loss benefit         Mixed hyperlipidemia    Overview   On atorvastatin 40 mg daily         Type 2 diabetes mellitus without complication, without long-term current use of insulin - Primary    Current Assessment & Plan   A1c about 1 month ago looks good at 6.0%.  This is down from 8.9% 3 months prior.  He is also lost 40 pounds in the last 4 months.  Will stop metformin entirely at this time.  Will increase Mounjaro from 5 to 7.5 mg weekly for added weight loss benefit.  Annual diabetic foot exam updated in the office today and was normal.  Annual diabetic eye exam is up-to-date.  He is going to schedule with a nutritionist soon to help him work on a low-carb diet.  Recommended increasing exercise and strength training.  Recommended [employer] as a possible resource for him.  He is on a statin.  He is not on an ACE inhibitor or an ARB since he is on Nebivolol currently for hypertension, however as he continues to lose weight I suspect he will be able to stop the Nebivolol as well.  Annual urine microalbumin and serum creatinine are up-to-date and normal.  Will recheck an A1c after it has been 3 months since the last check, which will be on 9/20/2025 or later.  He should continue with monthly phone calls with my clinical pharmacist colleague.  Follow-up with me in the office in 6 months.         Relevant Medications    tirzepatide (Mounjaro) 7.5 mg/0.5 mL pen injector    Other Relevant Orders    Hemoglobin A1C    Follow Up In [facility] - PCP - Established    Onychomycosis    Overview   Using Penlac solution          Jasmine Joy D.O.  Family Medicine Physician  [facility]  [address] [place] [address] [phone]    This note has been  transcribed using Dragon voice recognition system and there is a possibility of unintentional typing misprints.

## 2025-07-15 NOTE — PATIENT INSTRUCTIONS
Stop metformin  Increase mounjaro to 7.5mg weekly  Continue phone calls with jacqui  Go to the lab and get A1c checked on 9/20/25 or later  Increase exercise and strength training. [email or web address] has a lot of good resources. This will help with muscle loss  Schedule with the nutritionist  Get more consistent with the penlac solution for the toenails  Follow up with me in 6 months in the office

## 2025-07-15 NOTE — ASSESSMENT & PLAN NOTE
A1c about 1 month ago looks good at 6.0%.  This is down from 8.9% 3 months prior.  He is also lost 40 pounds in the last 4 months.  Will stop metformin entirely at this time.  Will increase Mounjaro from 5 to 7.5 mg weekly for added weight loss benefit.  Annual diabetic foot exam updated in the office today and was normal.  Annual diabetic eye exam is up-to-date.  He is going to schedule with a nutritionist soon to help him work on a low-carb diet.  Recommended increasing exercise and strength training.  Recommended [employer] as a possible resource for him.  He is on a statin.  He is not on an ACE inhibitor or an ARB since he is on Nebivolol currently for hypertension, however as he continues to lose weight I suspect he will be able to stop the Nebivolol as well.  Annual urine microalbumin and serum creatinine are up-to-date and normal.  Will recheck an A1c after it has been 3 months since the last check, which will be on 9/20/2025 or later.  He should continue with monthly phone calls with my clinical pharmacist colleague.  Follow-up with me in the office in 6 months.

## 2025-08-04 ENCOUNTER — APPOINTMENT (OUTPATIENT)
Dept: PHARMACY | Facility: HOSPITAL | Age: 60
End: 2025-08-04
Payer: COMMERCIAL

## 2025-08-04 ENCOUNTER — TELEMEDICINE CLINICAL SUPPORT (OUTPATIENT)
Dept: NUTRITION | Facility: HOSPITAL | Age: 60
End: 2025-08-04
Payer: COMMERCIAL

## 2025-08-04 VITALS — BODY MASS INDEX: 35.1 KG/M2 | HEIGHT: 70 IN

## 2025-08-04 DIAGNOSIS — E66.01 CLASS 2 SEVERE OBESITY DUE TO EXCESS CALORIES WITH SERIOUS COMORBIDITY AND BODY MASS INDEX (BMI) OF 39.0 TO 39.9 IN ADULT: ICD-10-CM

## 2025-08-04 DIAGNOSIS — G47.33 OSA (OBSTRUCTIVE SLEEP APNEA): ICD-10-CM

## 2025-08-04 DIAGNOSIS — E66.812 CLASS 2 SEVERE OBESITY DUE TO EXCESS CALORIES WITH SERIOUS COMORBIDITY AND BODY MASS INDEX (BMI) OF 39.0 TO 39.9 IN ADULT: ICD-10-CM

## 2025-08-04 DIAGNOSIS — K75.81 NASH (NONALCOHOLIC STEATOHEPATITIS): ICD-10-CM

## 2025-08-04 DIAGNOSIS — E11.9 TYPE 2 DIABETES MELLITUS WITHOUT COMPLICATION, WITHOUT LONG-TERM CURRENT USE OF INSULIN: Primary | ICD-10-CM

## 2025-08-04 DIAGNOSIS — I10 PRIMARY HYPERTENSION: ICD-10-CM

## 2025-08-04 DIAGNOSIS — E78.2 MIXED HYPERLIPIDEMIA: ICD-10-CM

## 2025-08-04 DIAGNOSIS — E11.69 TYPE 2 DIABETES MELLITUS WITH OTHER SPECIFIED COMPLICATION, UNSPECIFIED WHETHER LONG TERM INSULIN USE (MULTI): ICD-10-CM

## 2025-08-04 PROCEDURE — 97802 MEDICAL NUTRITION INDIV IN: CPT | Mod: 95

## 2025-08-04 RX ORDER — TIRZEPATIDE 10 MG/.5ML
10 INJECTION, SOLUTION SUBCUTANEOUS WEEKLY
Qty: 2 ML | Refills: 3 | Status: SHIPPED | OUTPATIENT
Start: 2025-08-04

## 2025-08-04 NOTE — PATIENT INSTRUCTIONS
Nutrition Topics Discussed today:   Maintaining a healthy diet and helping to preserve lean body mass (muscle mass) while on GLP-1 medications:   Aim to build balanced meals, one method that can be used is the plate method:   Half of the plate full of non-starchy vegetables. These foods contribute very little carbohydrate to the plate, and they add fiber to the meal. Salad, carrots, bell peppers, zucchini, broccoli, cauliflower, asparagus, radishes, carrots and green beans are a few examples of these foods. They can be served cooked or raw.    One quarter of plate including protein foods. Examples can include meat, nuts, seeds, cheese, cottage cheese, nut butter, fish, seafood, tofu, and edamame.    One quarter of the plate including carbohydrate foods. These foods include grains, fruit, legumes, starchy vegetables, milk and yogurt. Aim to eat at least half of the daily grains as whole grains.   Be sure to include heart healthy fats in moderation with your meals. Heart healthy fats play an important role in healthy and healthy sources can be found in avocados, unsalted nuts/seeds, fatty fish (tuna, salmon, herring), and plant oils (olive, canola avocado). When choosing fats from animal sources aim to choose low fat dairy products (low fat or skim milk, low fat cheese), and lean proteins (skinless poultry, lean beef such as 90/10 blend or higher).  Aim to eat 3 balanced meals a day using the plate method and 1-2 snacks as needed.   Do not skip meals.   While on GLP-1 medications your protein needs will be slightly increased so be sure to include protein with all of your meals and snacks in order to help preserve your muscles. You should aim for 110-125gm of protein a day. Some examples of high protein food sources can include:   1/2 cup of beans, peas or lentils = 7gm protein  1 eggs or 1/4th cup of egg substitute = 7gm protein   1 ounce of meat, fish, poultry or seafood = 7gm protein  1/4th cup of nuts = 7gm protein    2 tablespoons of nut butters (such as peanut butter) = 6-8gm protein   2 tablespoons seeds = 7gm protein  1/2 cup tofu = 9-11gm protein  1/2 cup soybeans (shelled edamame) =  7gm protein  8oz of low fat (1% or skim) milk = 8gm protein  8oz soymilk = 7gm protein  1 ounce of cheese = 7gm protein  3/4 cup plain yogurt = 8gm protein  3/4 cup Greek yogurt = 13-16gm protein   1/4th cup cottage cheese = 7gm protein   In addition to getting enough protein in your diet and eating balanced meals, your exercise regimen should include a few days a week of strength training. Muscle is built and maintained by strength training. Just eating enough protein in your diet alone is not enough to help maintain your muscle mass. General exercise recommendations for weight loss include:   150 mins a week of moderate intensity aerobic physical activity. This needs to be spread out throughout the week and can be achieved by doing 30mins 5 days a week.   Examples of aerobic can include walking, jogging, running, riding a bike and many more.   You can use the talk test to find out if you are at a moderate level intensity: if you are breathing hard while exercising, but still can have a conversation easily this is considered moderate intensity activity.   In addition to cardio recommendations, you will want to include 2-3 days a week of strength training.  Strength training can include lifting weights, using resistance bands, using your own body weight, using weight machines, squats, lunges, chair exercises and many more.  YouTube is a good place to start to find some free strength training classes to get you started.    Discussed importance of consistent meal pattern   Discussed how eating consistently and balanced meals help improve satiety, boot metabolism and helps to improve blood glucose levels   Encouraged paitient to eat first meal of the day within 1-2hrs after waking up to help boost metabolism   Encouraged meals and snacks to be   throughout the day with no more than a 3-4hr gap in between to help boost metabolism      Patient Goal(s):   Aim for 3 balanced meals a day using plate method with 1-3 balanced snacks   Aim for adequate protein intake to help preserve lean muscle,  aim for 110-125gm protein a day  Aim for 150 mins a week of moderate intensity aerobic activity  Aim for 2-3 days a week of strength training exercises     Follow Up: 9/15/2025

## 2025-08-04 NOTE — PROGRESS NOTES
Patient ID: Mohan Gibbons is a 59 y.o. male who presents for Obesity and Diabetes.  Pt is here for Follow Up appointment.     Referring Provider: Jasmine Joy DO  Last Visit: 7.15.25    Verbal consent to manage patient's drug therapy was obtained from patient. They were informed they may decline to participate or withdraw from participation in pharmacy services at any time.      Subjective     Drug Interactions  None at time of review    Medication System Management (Adherence)  Patient did take medications today.   Number of missed doses in last 7 days: 0  Can patient afford prescribed medications: Yes, no issues reported    Preferred pharmacy:     [facility] [address] [place], [place] - [address] [facility]  [address] [facility] [address]  Phone: [phone] Fax: [phone]    [facility]/pharmacy #[id number] - [place], [place] - [address] [facility] AT CORNER OF [facility]  [id number] [place] [address]  Phone: [phone] Fax: [phone]      [facility]  DIABETES MELLITUS TYPE 2:    Known diabetic complications: none.  Does patient follow with Endocrinology?: No     Last optometry exam: not assessed  Most recent visit in Podiatry: not assessed     Current diabetic medications include:  Mounjaro 7.5mg weekly     Adverse Effects: none reported     Home blood glucose records (mg/dL)  FB, 115, 137, 117, 138, 98, 115, 130, 123, 125    Any episodes of hypoglycemia? No, none reported.    Did patient treat episode of hypoglycemia appropriately? N/A  Does the patient have a prescription for ready-to-use Glucagon? 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 Plans to use the new  free time and nice weather to do yard work, housework, etc.   Illicit substances/Alcohol: craft beer on the weekends, feeling like he takes much longer to drink now  Appt with nutrition today!    Secondary Prevention  Aspirin? Yes  Statin? Yes  ACE-I/ARB? No  UACR/EGFR in last year?: Yes  ALBUMIN/CREATININE RATIO, RANDOM URINE   Date Value Ref Range Status   03/13/2025 5 <30 mg/g creat Final     Comment:        The ADA defines abnormalities in albumin  excretion as follows:     Albuminuria Category        Result (mg/g creatinine)     Normal to Mildly increased   <30  Moderately increased            Severely increased           > OR = 300     The ADA recommends that at least two of three  specimens collected within a 3-6 month period be  abnormal before considering a patient to be  within a diagnostic category.       Albumin/Creatine Ratio   Date Value Ref Range Status   07/07/2023 SEE COMMENT 0.0 - 30.0 ug/mg crt Final     Comment:     One or more analytes used in this calculation   is outside of the analytical measurement range.  Calculation cannot be performed.       Pertinent PMH Review:  PMH of Pancreatitis: No  PMH of Retinopathy: No  PMH of Urinary Tract Infections: No  PMH of MTC: No  PMH of MEN2: No      Review of Systems    Objective     BP Readings from Last 4 Encounters:   07/15/25 126/60   05/12/25 122/77   04/14/25 118/74   03/13/25 130/86      There were no vitals filed for this visit.     Labs  CREATININE   Date Value Ref Range Status   03/13/2025 0.78 0.70 - 1.35 mg/dL Final     EGFR   Date Value Ref Range Status   03/13/2025 101 > OR = 60 mL/min/1.73m2 Final     SODIUM   Date Value Ref Range Status   03/13/2025 140 135 - 146 mmol/L Final     POTASSIUM   Date Value Ref Range Status   03/13/2025 4.6 3.5 - 5.3 mmol/L Final     CHLORIDE   Date Value Ref Range Status   03/13/2025 105 98 - 110 mmol/L Final     UREA NITROGEN (BUN)   Date Value Ref Range Status   03/13/2025 16 7 - 25 mg/dL Final      AST   Date Value Ref Range Status   03/13/2025 32 10 - 35 U/L Final        Lab Results   Component Value Date    BILITOT 0.7 03/13/2025    CALCIUM 9.8 03/13/2025    CO2 24 03/13/2025     03/13/2025    CREATININE 0.78 03/13/2025    GLUCOSE 257 (H) 03/13/2025    ALKPHOS 77 03/13/2025    K 4.6 03/13/2025    PROT 7.2 03/13/2025     03/13/2025    AST 32 03/13/2025    ALT 46 03/13/2025    BUN 16 03/13/2025    ANIONGAP 11 03/13/2025    ALBUMIN 4.3 03/13/2025    GFRMALE >90 07/07/2023     Lab Results   Component Value Date    TRIG 240 (H) 03/13/2025    CHOL 128 03/13/2025    LDLCALC 62 03/13/2025    HDL 36 (L) 03/13/2025     Lab Results   Component Value Date    HGBA1C 6.0 (H) 06/20/2025     ALBUMIN/CREATININE RATIO, RANDOM URINE   Date Value Ref Range Status   03/13/2025 5 <30 mg/g creat Final     Comment:        The ADA defines abnormalities in albumin  excretion as follows:     Albuminuria Category        Result (mg/g creatinine)     Normal to Mildly increased   <30  Moderately increased            Severely increased           > OR = 300     The ADA recommends that at least two of three  specimens collected within a 3-6 month period be  abnormal before considering a patient to be  within a diagnostic category.       Albumin/Creatine Ratio   Date Value Ref Range Status   07/07/2023 SEE COMMENT 0.0 - 30.0 ug/mg crt Final     Comment:     One or more analytes used in this calculation   is outside of the analytical measurement range.  Calculation cannot be performed.       Current Outpatient Medications   Medication Instructions    allopurinol (ZYLOPRIM) 100 mg, oral, Daily    aspirin 81 mg    atorvastatin (LIPITOR) 40 mg, oral, Daily    cholecalciferol (VITAMIN D-3) 50 mcg    ciclopirox (Penlac) 8 % solution Topical, Nightly, , remove buildup with alcohol swab once weekly    fluorometholone (FML) 0.1 % ophthalmic suspension 1 drop    loratadine (CLARITIN) 10 mg    Mounjaro 10 mg, subcutaneous, Weekly     nebivolol (BYSTOLIC) 10 mg, oral, Daily        Assessment/Plan   Problem List Items Addressed This Visit           ICD-10-CM    ALONA (obstructive sleep apnea) G47.33    Relevant Medications    tirzepatide (Mounjaro) 10 mg/0.5 mL pen injector    Type 2 diabetes mellitus without complication, without long-term current use of insulin - Primary E11.9    Is pt A1c at goal? Yes, 6%  Tolerating current regimen. A1c much improved. Will increase Mounjaro for additional weight loss benefit and bg control since off metformin now.    Medication Changes:  INCREASE  Mounjaro to 10mg weekly      PATIENT EDUCATION/GOALS  Average < 150mg/dL  Time in range > 70%  Fasting B - 130 mg/dL  Postprandial BG: less than 180 mg/dL  A1c: less than 7%    Low and High Blood Sugar  Symptoms of low blood sugar include: Fast heartbeat, shaking, sweating, nervousness or anxiety, irritability or confusion, and/or dizziness.  Symptoms of high blood sugar include: Feeling more thirsty than usual, urinating often, losing weight without trying, presence of ketones in the urine, feeling tired and weak, feeling irritable or having other mood changes, having blurry vision, and/or having slow-healing sores.  If you experience symptoms of low blood sugar (blood sugar less than 70 mg/dL) follow the rule of 15 by eating ~15 g of simple carbohydrates (examples: half cup juice, 3-4 glucose tabs, 1 tablespoon of sugar, honey, or syrup).    Dietary Recommendations  The a lower carbohydrate or Mediterranean diet is often recommended for patients with elevated blood glucose.   Food recommendations:   Focus on whole foods, with as few ingredients as possible.   Focus on lower glycemic foods (GI of 55 or less): this includes most fruits and vegetables, beans, minimally processed grains, dairy, nuts and seeds.  Minimize moderate glycemic index (GI 56 to 69) foods: White and sweet potatoes, corn, white rice, couscous, breakfast cereals such as Cream of  Wheat.  Avoid high glycemic index (GI of 70 or higher): White bread, rice cakes, most crackers, bagels, cakes, doughnuts, croissants, most packaged breakfast cereals.  Include 1-2 servings weekly fatty fish that are low in mercury such as salmon, mackerel, anchovies, sardines, and herring. Avoid frying fish. Bake, steam, or poach.   Avoid trans-fats (fried foods, microwave popcorn, margarine, etc.).   Use oils such as coconut oil, olive oil, avocado oil, or ghee. Select oils appropriate for the temperature you are cooking at.   Avoid processed meats (such as deli meat), canned soups, soy sauce, and fried foods these are all high in added sodium.   The recommended sodium intake for most people is around 2,300 mg/day (too little or too much salt can affect blood pressure).   It is ok to add salt to suit your taste to fresh whole foods (such as vegetables) that you are cooking at home.   Include 4-5 servings daily of both fruits and vegetables. Fruits and vegetables are a good source of fiber, potassium, and magnesium which help support healthy blood pressure.  Focus on eating a variety of colors each day (eating the rainbow- such as red peppers, orange carrots, yellow beans, green lettuce, blue/purple berries, white/brown onions).   Avoid foods with added sugars (goal of <10 grams/serving of added sugar). Limit added sugars to less than 24 (women)-36 (men) grams daily.  Beverage recommendations:    Avoid caffeinated drinks such as coffee, energy drinks, and soda (both regular and diet). Consider sparkling water, water with lemon (or other fruit), or black, oolong, or green tea (prior to noon).   Avoid regular consumption of alcohol. If it is a special occasion the recommended alcohol intake for a male is 2 (men) or 1(women) or less drinks per day.  Alcohol consumption may place people with diabetes at increased risk for delayed hypoglycemia (low blood sugar) especially if taking other medications that may cause  hypoglycemia such as insulin.     Lifestyle Recommendations  Avoid tobacco products (including chewing tobacco and vaping).   Continue to integrate regular movement and enjoyable forms of exercise into your weekly routine. The recommended exercise regimen is 150 minutes per week (for example 5 days per week, 30 minutes per day).   Consider walking for 10-15 minutes after each meal in order to help control blood sugar.   Manage/reduce stress  Consider therapy, mindfulness, breathing exercises (4-7-8 breath), meditation, yoga, journaling, addressing/removing stressors, etc.   Sleep  Goal of 7-9 hours of restful sleep nightly.        Mounjaro Education:  Counseled patient on Mounjaro MOA, expectations, side effects, duration of therapy, administration, and monitoring parameters.  Counseled patient on the benefits of GLP-1ra, such as cardiovascular risk reduction, glycemic control, and weight loss potential.  Provided detailed dosing and administration counseling to ensure proper technique.   Reviewed Mounjaro titration schedule, starting with 2.5 mg once weekly to 5 mg, 7.5mg, 10mg, 12.5mg and if tolerated 15 mg.  Reviewed storage requirements of Mounjaro when not in use, and when to administer the medication if a dose is missed.  Discussed risks of GLP1ra including risk of pancreatitis, MTC and worsening of DR  Advised patient that they may experience improved satiety after meals and portion sizes of meals may be reduced as doses of Mounjaro increase.         Relevant Medications    tirzepatide (Mounjaro) 10 mg/0.5 mL pen injector    Other Relevant Orders    Referral to Clinical Pharmacy     Other Visit Diagnoses         Codes      Class 2 severe obesity due to excess calories with serious comorbidity and body mass index (BMI) of 39.0 to 39.9 in adult     E66.812, E66.01, Z68.39    Relevant Medications    tirzepatide (Mounjaro) 10 mg/0.5 mL pen injector      LING (nonalcoholic steatohepatitis)     K75.81    Relevant  Medications    tirzepatide (Mounjaro) 10 mg/0.5 mL pen injector      Type 2 diabetes mellitus with other specified complication, unspecified whether long term insulin use (Multi)     E11.69    Relevant Medications    tirzepatide (Mounjaro) 10 mg/0.5 mL pen injector                  Health Maintenance Due   Topic Date Due    HIV Screening  Never done    Diabetes: Retinopathy Screening  Never done    Hepatitis C Screening  Never done    Hepatitis A Vaccines (1 of 2 - Risk 2-dose series) Never done    Pneumococcal Vaccine (1 of 2 - PCV) 12/30/1982    MMR Vaccines (1 of 1 - Standard series) Never done    PSA Prostate Cancer Screening  07/15/2023    RSV High Risk: (Elderly (60+) or Pregnant Population) (1 - Risk 60-74 years 1-dose series) Never done    Hepatitis B Vaccines (1 of 3 - Risk 3-dose series) Never done    Influenza Vaccine (1) 09/01/2025    Diabetes: Hemoglobin A1C  09/20/2025     Labs ordered:  none  Referrals:  none  Follow-up: 5 weeks     Patient was provided with PharmD phone number and encouraged to reach out with any questions or concerns Prior to next appointment or ask provider for another pharmacy referral.    Time spent with pt: Total length of time 12 (minutes) of the encounter and more than 50% was spent counseling the patient.  Patient is NOT followed by CCM.     Thank you for allowing to take part in the care of this patient.    Erica Carcamo, PharmD, IVAN  Clinical Pharmacist  [phone]    Continue all meds under the continuation of care with the referring provider and clinical pharmacy team.    Verbal consent to manage patient's drug therapy was obtained from the patient. They were informed they may decline to participate or withdraw from participation in pharmacy services at any time.

## 2025-08-04 NOTE — ASSESSMENT & PLAN NOTE
Is pt A1c at goal? Yes, 6%  Tolerating current regimen. A1c much improved. Will increase Mounjaro for additional weight loss benefit and bg control since off metformin now.    Medication Changes:  INCREASE  Mounjaro to 10mg weekly      PATIENT EDUCATION/GOALS  Average < 150mg/dL  Time in range > 70%  Fasting B - 130 mg/dL  Postprandial BG: less than 180 mg/dL  A1c: less than 7%    Low and High Blood Sugar  Symptoms of low blood sugar include: Fast heartbeat, shaking, sweating, nervousness or anxiety, irritability or confusion, and/or dizziness.  Symptoms of high blood sugar include: Feeling more thirsty than usual, urinating often, losing weight without trying, presence of ketones in the urine, feeling tired and weak, feeling irritable or having other mood changes, having blurry vision, and/or having slow-healing sores.  If you experience symptoms of low blood sugar (blood sugar less than 70 mg/dL) follow the rule of 15 by eating ~15 g of simple carbohydrates (examples: half cup juice, 3-4 glucose tabs, 1 tablespoon of sugar, honey, or syrup).    Dietary Recommendations  The a lower carbohydrate or Mediterranean diet is often recommended for patients with elevated blood glucose.   Food recommendations:   Focus on whole foods, with as few ingredients as possible.   Focus on lower glycemic foods (GI of 55 or less): this includes most fruits and vegetables, beans, minimally processed grains, dairy, nuts and seeds.  Minimize moderate glycemic index (GI 56 to 69) foods: White and sweet potatoes, corn, white rice, couscous, breakfast cereals such as Cream of Wheat.  Avoid high glycemic index (GI of 70 or higher): White bread, rice cakes, most crackers, bagels, cakes, doughnuts, croissants, most packaged breakfast cereals.  Include 1-2 servings weekly fatty fish that are low in mercury such as salmon, mackerel, anchovies, sardines, and herring. Avoid frying fish. Bake, steam, or poach.   Avoid trans-fats (fried foods,  microwave popcorn, margarine, etc.).   Use oils such as coconut oil, olive oil, avocado oil, or ghee. Select oils appropriate for the temperature you are cooking at.   Avoid processed meats (such as deli meat), canned soups, soy sauce, and fried foods these are all high in added sodium.   The recommended sodium intake for most people is around 2,300 mg/day (too little or too much salt can affect blood pressure).   It is ok to add salt to suit your taste to fresh whole foods (such as vegetables) that you are cooking at home.   Include 4-5 servings daily of both fruits and vegetables. Fruits and vegetables are a good source of fiber, potassium, and magnesium which help support healthy blood pressure.  Focus on eating a variety of colors each day (eating the rainbow- such as red peppers, orange carrots, yellow beans, green lettuce, blue/purple berries, white/brown onions).   Avoid foods with added sugars (goal of <10 grams/serving of added sugar). Limit added sugars to less than 24 (women)-36 (men) grams daily.  Beverage recommendations:    Avoid caffeinated drinks such as coffee, energy drinks, and soda (both regular and diet). Consider sparkling water, water with lemon (or other fruit), or black, oolong, or green tea (prior to noon).   Avoid regular consumption of alcohol. If it is a special occasion the recommended alcohol intake for a male is 2 (men) or 1(women) or less drinks per day.  Alcohol consumption may place people with diabetes at increased risk for delayed hypoglycemia (low blood sugar) especially if taking other medications that may cause hypoglycemia such as insulin.     Lifestyle Recommendations  Avoid tobacco products (including chewing tobacco and vaping).   Continue to integrate regular movement and enjoyable forms of exercise into your weekly routine. The recommended exercise regimen is 150 minutes per week (for example 5 days per week, 30 minutes per day).   Consider walking for 10-15 minutes  after each meal in order to help control blood sugar.   Manage/reduce stress  Consider therapy, mindfulness, breathing exercises (4-7-8 breath), meditation, yoga, journaling, addressing/removing stressors, etc.   Sleep  Goal of 7-9 hours of restful sleep nightly.        Mounjaro Education:  Counseled patient on Mounjaro MOA, expectations, side effects, duration of therapy, administration, and monitoring parameters.  Counseled patient on the benefits of GLP-1ra, such as cardiovascular risk reduction, glycemic control, and weight loss potential.  Provided detailed dosing and administration counseling to ensure proper technique.   Reviewed Mounjaro titration schedule, starting with 2.5 mg once weekly to 5 mg, 7.5mg, 10mg, 12.5mg and if tolerated 15 mg.  Reviewed storage requirements of Mounjaro when not in use, and when to administer the medication if a dose is missed.  Discussed risks of GLP1ra including risk of pancreatitis, MTC and worsening of DR  Advised patient that they may experience improved satiety after meals and portion sizes of meals may be reduced as doses of Mounjaro increase.

## 2025-08-04 NOTE — PROGRESS NOTES
Nutrition Initial Assessment:     Patient Mohan Gibbons is a 59 y.o. male being seen via virtual visit, phone call only who was referred by Jasmine Joy DO on 6/6/25 for   1. Type 2 diabetes mellitus without complication, without long-term current use of insulin        2. Class 2 severe obesity due to excess calories with serious comorbidity and body mass index (BMI) of 39.0 to 39.9 in adult        3. Mixed hyperlipidemia        4. LING (nonalcoholic steatohepatitis)        5. ALONA (obstructive sleep apnea)        6. Primary hypertension            Virtual or Telephone Consent  While technically available, the patient was unable or unwilling to consent to connect via audio/video telehealth technology; therefore, I performed this visit using a real-time audio only connection between Mohan Gibbons & Lolis Cancino RD.  Verbal consent was requested and obtained from Mohan Gibbons on this date, 08/04/25 for a telehealth visit and the patient's location was confirmed at the time of the visit.    Nutrition Assessment    Problem List[1]    Nutrition History:  Food & Nutrition History: Was referred by clinical pharmacy to RDN as he has been on Mounjaro since 3/2025 and has lost ~40# (started 285# and now down to 245#). Currently on 7.5mg and has been on this for a month now; will be going up to 10mg next Monday. Noted one of the things he has noticed is that he has lost some muscle mass. Stated that his clinical pharmacist thought it would be helpful to speak with an RDN. Has not changed his eating habits aside from decreasing his portions sizes (eating about half of what he used to eat). Looking for some guidance on how to eat while on a GLP-1 medication and how to help preserve muscle mass. Semi-retired (since April 2025) and now works 20hrs a week. Stated his wife is on Zepbound. GI Symptoms : no issues now (previously was on Metformin and had some GI issues but has been taking off this), has been  "tolerating Mounjaro.  Oral Problems: denies.  Dentition : no issues.  Food Insecurity: absent.  Food Allergies:  (none);  Food Intolerances: none  Vitamin/mineral intake: D (PreserVision)    Herbal supplements: none  Sleep Habits: 7+ hrs continuous (Goes to bed 11pm-12am; wakes up 8-9am. Has severe sleep apnea and wear his CPAP regularly.)    Diet Recall:  B: skips  L (12pm): out to lunch 2 days a week and may get sub sandwich or salad bar from [employer]; when he is home will have calzone from [employer] or a sandwich (italian meat)  D (6-6:30pm): pork chops or pot roast with a potato or corn and sometimes green veggies; OR may have [employer] (hamburger) or [employer] subs or pizza  Snacks (~9pm) sometimes grazing after dinner on chips or bowl of ice cream  Food Variety: Present  Oral Nutrition Supplement Use: Oral Nutrition Supplements:  (none)           Fluid Intake: Coffee (creamer, stevia sweetener), diet or zero soda, unsweet iced tea sweetened with sugar substitute, water (feels he may need to drink more) beer on the weekends (will have x4 a week)  Energy Intake: Good > 75 %    Diabetes Nutrition History:  Diagnosed: 3/2025; has been pre-DM for awhile per pt   Prior Nutrition Education: not really   SMBG: fasting test every morning and usually run ~105-130  Hypoglycemia: none  Current DM Medications/Insulin Regimen: Mounjaro; previously on Metformin but this was d/c'ed     Food Preparation:  Cooking: Spouse/Significant Other  Grocery Shopping: Spouse/Significant Other  Dining Out: 1 to 3 times a week    Physical Activity:   None currently, but does some yard work or doing home projects    Anthropometrics:  Height: 177.8 cm (5' 10\")   IBW/kg (Dietitian Calculated): 76.5 kg Percent of IBW: 145 %     Adjusted Body Weight (kg): 85.1 kg  BMI Readings from Last 1 Encounters:   08/04/25 35.10 kg/m²     Weight History:   Daily Weight  07/15/25 : 111 kg (244 lb 9.6 oz)  05/12/25 : 122 kg (269 lb)  04/14/25 : 124 kg (274 " lb)  03/13/25 : 129 kg (285 lb 6.4 oz)  06/26/24 : 126 kg (278 lb 12.8 oz)  05/13/24 : 128 kg (281 lb 9.6 oz)  07/07/23 : 130 kg (285 lb 12.8 oz)  03/15/23 : 131 kg (288 lb 6.4 oz)  02/13/23 : 131 kg (288 lb)  01/06/23 : 128 kg (283 lb 4 oz)    Weight Change %:  Weight History / % Weight Change: Wt loss of 10.5% in ~3 months; ~14.6% x ~1yr per objective wt hx (intentional)    Nutrition Focused Physical Exam Findings:  Subcutaneous Fat Loss:   Defer Subcutaneous Fat Loss Assessment: Defer all  Defer All Reason: Virtual or phone only visit    Muscle Wasting:  Defer Muscle Wasting Assessment: Defer all  Defer All Reason: Virtual or phone only visit    Nutrition Significant Labs:  CMP Trend:   Recent Labs     03/13/25  0928 06/26/24  1037 07/07/23  1049   GLUCOSE 257* 169* 145*    139 138   K 4.6 4.3 4.5    108* 107   CO2 24 24 24   ANIONGAP 11 11 12   BUN 16 12 15   CREATININE 0.78 0.85 0.87   EGFR 101 >90  --    CALCIUM 9.8 9.2 9.0   ALBUMIN 4.3 4.1 4.0   ALKPHOS 77 84 78   PROT 7.2 6.7 6.7   AST 32 19 21   BILITOT 0.7 0.5 0.6   ALT 46 27 28      CBC Trend:  Recent Labs     03/13/25  0928 06/26/24  1037 07/07/23  1049 03/30/22  1531 07/22/21  1017 08/07/20  1117 07/30/19  1055   WBC 8.7 8.9 8.5   < > 8.2   < > 7.9   NRBC  --  0.0  --   --  0.0  --  0.0   RBC 4.67 4.61 4.55   < > 4.76   < > 4.83   HGB 14.3 14.0 13.7   < > 14.5   < > 14.7   HCT 42.4 42.3 41.8   < > 44.5   < > 44.4   MCV 90.8 92 92   < > 93   < > 92   MCH 30.6 30.4  --   --   --   --   --    MCHC 33.7 33.1 32.8   < > 32.6   < > 33.1   RDW 12.9 13.2 13.3   < > 13.2   < > 13.7    236 233   < > 214   < > 220    < > = values in this interval not displayed.     HgbA1c trend:  Recent Labs     06/20/25  1230 03/13/25  0843 06/26/24  1037   HGBA1C 6.0* 8.9* 6.8*     Lipid Panel Trend:   Recent Labs     03/13/25  0928 06/26/24  1037 07/07/23  1049 07/15/22  1007 07/22/21  1017   CHOL 128 121 124 105 139   HDL 36* 39.4 33.2* 32.0* 34.0*    LDLCALC 62 49  --   --   --    LDLF  --   --  46 30 52   VLDL  --  32 45* 43* 53*   TRIG 240* 162* 226* 216* 265*     Serum Vitamin D Trend:   Recent Labs     03/13/25  0928 07/07/23  1049 07/15/22  1007   VITD25 39 37 26*     Medications:  Current Outpatient Medications   Medication Instructions    allopurinol (ZYLOPRIM) 100 mg, oral, Daily    aspirin 81 mg    atorvastatin (LIPITOR) 40 mg, oral, Daily    cholecalciferol (VITAMIN D-3) 50 mcg    ciclopirox (Penlac) 8 % solution Topical, Nightly, , remove buildup with alcohol swab once weekly    fluorometholone (FML) 0.1 % ophthalmic suspension 1 drop    loratadine (CLARITIN) 10 mg    Mounjaro 10 mg, subcutaneous, Weekly    nebivolol (BYSTOLIC) 10 mg, oral, Daily        Estimated Needs:  Total Protein Estimated Needs in 24 Hours (g):  (110-125gm); Method for Estimating 24 Hour Protein Needs: 1.3-1.5gm/kg AjBW       Nutrition Diagnosis   Malnutrition Diagnosis  Patient has Malnutrition Diagnosis: No    Nutrition Diagnosis  Patient has Nutrition Diagnosis: Yes  Diagnosis Status (1): New  Nutrition Diagnosis 1: Unbalanced diet pattern  Related to (1): limited or lack of prior nutrition-related education  As Evidenced by (1): per pt report looking for guidance on healthy eating patterns, intake of unbalanced meals and snacks per diet recall       Nutrition Interventions/Recommendations   Nutrition Prescription: Oral nutrition      Nutrition Interventions:   Food and Nutrient Delivery: Meals & Snacks: General Healthful Diet, Protein-modified diet  Goals: 1. Aim for 3 balanced meals a day using plate method with 1-3 balanced snacks 2. Aim for adequate protein intake to help preserve lean muscle mass, aim for 110-125gm protein a day  Other:: Physical Activity  Goals: 3. Aim for 150 mins a week of moderate intensity aerobic activity   4.  Aim for 2-3 days a week of strength training exercises     Coordination of Care:       Nutrition Education:   Nutrition Education  Content: Content related nutrition education, Physical activity guidance  Reviewed building balanced meals using the plate method; talked about timing of meals to help nourish the body and boost metabolism; talked about importance of adequate protein intake while on GLP-1 medications to help preserve lean body mass and reviewed food sources that are good sources of protein; reviewed general exercise recs of 150mins a week of moderate intensity aerobic exercise and the importance of adding in at least 2-3 days a week of strength training exercises to help preserve/build lean body mass    Educational Handouts Provided: ADA Plate Method,  Balanced Breakfast, High Protein Meal Ideas, High Protein Snack Ideas, and NCM CHO Counting for People With Diabetes , NCM High Protein Foods List     Nutrition Counseling:   Theoretical Basis/Approach and/or Counseling Strategies:   Nutrition Counseling Strategies : Nutrition counseling based on motivational interviewing strategy    Readiness to Change: Good   Level of Understanding: Good  Anticipated Compliance: Good       Nutrition Monitoring and Evaluation   Meal/Snack Pattern: Estimated meal and snack pattern  Criteria: Monitor patient's progress towards meal and snack goal(s)    Body Weight: Measured body weight  Criteria: Monitor patient's progress towards intentional weight loss of 0.5-2 lb per week, trending toward a clinically significant weight loss of 5-10% of current body weight.    Glucose/Endocrine Profile: Hemoglobin A1c (HgbA1c)  Criteria: <7%  Lipid Profile: Triglycerides, serum  Criteria: CHOL <200;  HDL 40-60;  LDL <70;  TG <150 mg/dL    Goal Status: New goal identified     Follow Up: 9/15/2025    Time Spent  Prep time on day of patient encounter: 5 minutes  Time spent directly with patient, family or caregiver: 60 minutes  Additional Time Spent on Patient Care Activities: 10 minutes (uploading and sending education handouts via [employer])  Documentation Time: 5  minutes             [1]   Patient Active Problem List  Diagnosis    Primary hypertension    Fuchs' corneal dystrophy of both eyes    GERD (gastroesophageal reflux disease)    Gout    Class 2 severe obesity due to excess calories with serious comorbidity and body mass index (BMI) of 35.0 to 35.9 in adult    Mixed hyperlipidemia    ALONA (obstructive sleep apnea)    Type 2 diabetes mellitus without complication, without long-term current use of insulin    Pseudophakia of both eyes    Onychomycosis

## 2025-09-02 ENCOUNTER — APPOINTMENT (OUTPATIENT)
Dept: PHARMACY | Facility: HOSPITAL | Age: 60
End: 2025-09-02
Payer: COMMERCIAL

## 2025-11-03 ENCOUNTER — APPOINTMENT (OUTPATIENT)
Dept: PHARMACY | Facility: HOSPITAL | Age: 60
End: 2025-11-03
Payer: COMMERCIAL

## 2026-01-13 ENCOUNTER — APPOINTMENT (OUTPATIENT)
Dept: PRIMARY CARE | Facility: CLINIC | Age: 61
End: 2026-01-13
Payer: COMMERCIAL

## 2026-05-14 ENCOUNTER — APPOINTMENT (OUTPATIENT)
Facility: CLINIC | Age: 61
End: 2026-05-14
Payer: COMMERCIAL